# Patient Record
Sex: MALE | Race: BLACK OR AFRICAN AMERICAN | NOT HISPANIC OR LATINO | Employment: FULL TIME | URBAN - METROPOLITAN AREA
[De-identification: names, ages, dates, MRNs, and addresses within clinical notes are randomized per-mention and may not be internally consistent; named-entity substitution may affect disease eponyms.]

---

## 2017-04-07 ENCOUNTER — APPOINTMENT (OUTPATIENT)
Dept: RADIOLOGY | Facility: HOSPITAL | Age: 32
End: 2017-04-07
Attending: NURSE PRACTITIONER
Payer: COMMERCIAL

## 2017-04-07 ENCOUNTER — TELEPHONE (OUTPATIENT)
Dept: FAMILY MEDICINE | Facility: CLINIC | Age: 32
End: 2017-04-07

## 2017-04-07 ENCOUNTER — LAB VISIT (OUTPATIENT)
Dept: LAB | Facility: HOSPITAL | Age: 32
End: 2017-04-07
Payer: COMMERCIAL

## 2017-04-07 ENCOUNTER — OFFICE VISIT (OUTPATIENT)
Dept: FAMILY MEDICINE | Facility: CLINIC | Age: 32
End: 2017-04-07
Payer: COMMERCIAL

## 2017-04-07 VITALS
RESPIRATION RATE: 16 BRPM | DIASTOLIC BLOOD PRESSURE: 80 MMHG | SYSTOLIC BLOOD PRESSURE: 132 MMHG | BODY MASS INDEX: 31.06 KG/M2 | WEIGHT: 249.81 LBS | TEMPERATURE: 98 F | OXYGEN SATURATION: 97 % | HEIGHT: 75 IN | HEART RATE: 67 BPM

## 2017-04-07 DIAGNOSIS — Z00.00 ANNUAL PHYSICAL EXAM: ICD-10-CM

## 2017-04-07 DIAGNOSIS — Z00.00 ANNUAL PHYSICAL EXAM: Primary | ICD-10-CM

## 2017-04-07 DIAGNOSIS — M54.50 LEFT-SIDED LOW BACK PAIN WITHOUT SCIATICA, UNSPECIFIED CHRONICITY: ICD-10-CM

## 2017-04-07 DIAGNOSIS — R31.9 HEMATURIA: ICD-10-CM

## 2017-04-07 DIAGNOSIS — M43.10 RETROLISTHESIS OF VERTEBRAE: Primary | ICD-10-CM

## 2017-04-07 LAB
AMORPH CRY URNS QL MICRO: ABNORMAL
ANION GAP SERPL CALC-SCNC: 7 MMOL/L
BACTERIA #/AREA URNS HPF: ABNORMAL /HPF
BASOPHILS # BLD AUTO: 0.03 K/UL
BASOPHILS NFR BLD: 0.6 %
BILIRUB SERPL-MCNC: NORMAL MG/DL
BILIRUB UR QL STRIP: NEGATIVE
BLOOD URINE, POC: NORMAL
BUN SERPL-MCNC: 10 MG/DL
CALCIUM SERPL-MCNC: 9.4 MG/DL
CHLORIDE SERPL-SCNC: 106 MMOL/L
CHOLEST/HDLC SERPL: 6.1 {RATIO}
CLARITY UR: ABNORMAL
CO2 SERPL-SCNC: 28 MMOL/L
COLOR UR: ABNORMAL
COLOR, POC UA: NORMAL
CREAT SERPL-MCNC: 1 MG/DL
DIFFERENTIAL METHOD: ABNORMAL
EOSINOPHIL # BLD AUTO: 0.2 K/UL
EOSINOPHIL NFR BLD: 4.3 %
ERYTHROCYTE [DISTWIDTH] IN BLOOD BY AUTOMATED COUNT: 12.5 %
EST. GFR  (AFRICAN AMERICAN): >60 ML/MIN/1.73 M^2
EST. GFR  (NON AFRICAN AMERICAN): >60 ML/MIN/1.73 M^2
GLUCOSE SERPL-MCNC: 83 MG/DL
GLUCOSE UR QL STRIP: NEGATIVE
GLUCOSE UR QL STRIP: NORMAL
HCT VFR BLD AUTO: 45.3 %
HDL/CHOLESTEROL RATIO: 16.5 %
HDLC SERPL-MCNC: 200 MG/DL
HDLC SERPL-MCNC: 33 MG/DL
HGB BLD-MCNC: 15.7 G/DL
HGB UR QL STRIP: NEGATIVE
KETONES UR QL STRIP: NEGATIVE
KETONES UR QL STRIP: NORMAL
LDLC SERPL CALC-MCNC: 141 MG/DL
LEUKOCYTE ESTERASE UR QL STRIP: ABNORMAL
LEUKOCYTE ESTERASE URINE, POC: NORMAL
LYMPHOCYTES # BLD AUTO: 2.9 K/UL
LYMPHOCYTES NFR BLD: 54 %
MCH RBC QN AUTO: 31.5 PG
MCHC RBC AUTO-ENTMCNC: 34.7 %
MCV RBC AUTO: 91 FL
MICROSCOPIC COMMENT: ABNORMAL
MONOCYTES # BLD AUTO: 0.6 K/UL
MONOCYTES NFR BLD: 10.4 %
NEUTROPHILS # BLD AUTO: 1.7 K/UL
NEUTROPHILS NFR BLD: 30.5 %
NITRITE UR QL STRIP: NEGATIVE
NITRITE, POC UA: NORMAL
NONHDLC SERPL-MCNC: 167 MG/DL
PH UR STRIP: 5 [PH] (ref 5–8)
PH, POC UA: 6
PLATELET # BLD AUTO: 221 K/UL
PMV BLD AUTO: 11.8 FL
POTASSIUM SERPL-SCNC: 4.5 MMOL/L
PROT UR QL STRIP: NEGATIVE
PROTEIN, POC: NORMAL
RBC # BLD AUTO: 4.99 M/UL
RBC #/AREA URNS HPF: 2 /HPF (ref 0–4)
SODIUM SERPL-SCNC: 141 MMOL/L
SP GR UR STRIP: 1.03 (ref 1–1.03)
SPECIFIC GRAVITY, POC UA: 1.02
SQUAMOUS #/AREA URNS HPF: 5 /HPF
TRIGL SERPL-MCNC: 130 MG/DL
URN SPEC COLLECT METH UR: ABNORMAL
UROBILINOGEN UR STRIP-ACNC: NEGATIVE EU/DL
UROBILINOGEN, POC UA: NORMAL
WBC # BLD AUTO: 5.39 K/UL
WBC #/AREA URNS HPF: 1 /HPF (ref 0–5)

## 2017-04-07 PROCEDURE — 1160F RVW MEDS BY RX/DR IN RCRD: CPT | Mod: S$GLB,,, | Performed by: NURSE PRACTITIONER

## 2017-04-07 PROCEDURE — 36415 COLL VENOUS BLD VENIPUNCTURE: CPT

## 2017-04-07 PROCEDURE — 72114 X-RAY EXAM L-S SPINE BENDING: CPT | Mod: 26,,, | Performed by: RADIOLOGY

## 2017-04-07 PROCEDURE — 81001 URINALYSIS AUTO W/SCOPE: CPT | Mod: S$GLB,,, | Performed by: NURSE PRACTITIONER

## 2017-04-07 PROCEDURE — 85025 COMPLETE CBC W/AUTO DIFF WBC: CPT

## 2017-04-07 PROCEDURE — 99999 PR PBB SHADOW E&M-EST. PATIENT-LVL IV: CPT | Mod: PBBFAC,,, | Performed by: NURSE PRACTITIONER

## 2017-04-07 PROCEDURE — 80061 LIPID PANEL: CPT

## 2017-04-07 PROCEDURE — 87086 URINE CULTURE/COLONY COUNT: CPT

## 2017-04-07 PROCEDURE — 99214 OFFICE O/P EST MOD 30 MIN: CPT | Mod: 25,S$GLB,, | Performed by: NURSE PRACTITIONER

## 2017-04-07 PROCEDURE — 80048 BASIC METABOLIC PNL TOTAL CA: CPT

## 2017-04-07 PROCEDURE — 99395 PREV VISIT EST AGE 18-39: CPT | Mod: S$GLB,,, | Performed by: NURSE PRACTITIONER

## 2017-04-07 PROCEDURE — 72114 X-RAY EXAM L-S SPINE BENDING: CPT | Mod: TC,PN

## 2017-04-07 PROCEDURE — 81000 URINALYSIS NONAUTO W/SCOPE: CPT

## 2017-04-07 RX ORDER — DIAZEPAM 5 MG/1
5 TABLET ORAL EVERY 6 HOURS PRN
Qty: 20 TABLET | Refills: 0 | Status: SHIPPED | OUTPATIENT
Start: 2017-04-07 | End: 2019-07-05

## 2017-04-07 RX ORDER — AMLODIPINE BESYLATE 5 MG/1
5 TABLET ORAL
COMMUNITY
End: 2019-07-05 | Stop reason: SDUPTHER

## 2017-04-07 RX ORDER — IBUPROFEN 600 MG/1
600 TABLET ORAL 3 TIMES DAILY PRN
Qty: 45 TABLET | Refills: 0 | Status: SHIPPED | OUTPATIENT
Start: 2017-04-07 | End: 2017-05-07

## 2017-04-07 NOTE — PATIENT INSTRUCTIONS
Follow up in 1 year, sooner if necessary  ER for new worse or concerning symptoms    Back Basics: A Healthy Spine  A healthy spine supports the body while letting it move freely. It does this with the help of three natural curves. Strong, flexible muscles help, too. They support the spine by keeping its curves properly aligned. The disks that cushion the bones of your spine also play a role in back fitness.    Three natural curves  The spine is made of bones (vertebrae) and pads of soft tissue (disks). These parts are arranged in three curves: cervical, thoracic, and lumbar. When properly aligned, these curves keep your body balanced. They also support your body when you move. By distributing your weight throughout your spine, the curves make back injuries less likely.  Strong, flexible muscles  Strong, flexible back muscles help support the three curves of the spine. They do so by holding the vertebrae and disks in proper alignment. Strong, flexible abdominal, hip, and leg muscles also reduce strain on the back.  The lumbar curve  The lumbar curve is the hardest-working part of the spine. It carries more weight and moves the most. Aligning this curve helps prevent damage to vertebrae, disks, and other parts of the spine.  Cushioning disks  Disks are the soft pads of tissue between the vertebrae. The disks absorb shock caused by movement. Each disk has a spongy center (nucleus) and a tougher outer ring (annulus). Movement within the nucleus allows the vertebrae to rock back and forth on the disks. This provides the flexibility needed to bend and move.       Date Last Reviewed: 10/18/2015  © 5244-8208 [employer]. [address] [place] [address], [place] [address]. All rights reserved. This information is not intended as a substitute for professional medical care. Always follow your healthcare professional's instructions.        Back Care Tips    Caring for your back  These are things you can do to prevent a  recurrence of acute back pain and to reduce symptoms from chronic back pain:  · Maintain a healthy weight. If you are overweight, losing weight will help most types of back pain.  · Exercise is an important part of recovery from most types of back pain. The muscles behind and in front of the spine support the back. This means strengthening both the back muscles and the abdominal muscles will provide better support for your spine.   · Swimming and brisk walking are good overall exercises to improve your fitness level.  · Practice safe lifting methods (below).  · Practice good posture when sitting, standing and walking. Avoid prolonged sitting. This puts more stress on the lower back than standing or walking.  · Wear quality shoes with sufficient arch support. Foot and ankle alignment can affect back symptoms. Women should avoid wearing high heels.  · Therapeutic massage can help relax the back muscles without stretching them.  · During the first 24 to 72 hours after an acute injury or flare-up of chronic back pain, apply an ice pack to the painful area for 20 minutes and then remove it for 20 minutes, over a period of 60 to 90 minutes, or several times a day. As a safety precaution, do not use a heating pad at bedtime. Sleeping on a heating pad can lead to skin burns or tissue damage.  · You can alternate ice and heat therapies.  Medications  Talk to your healthcare provider before using medicines, especially if you have other medical problems or are taking other medicines.  · You may use acetaminophen or ibuprofen to control pain, unless your healthcare provider prescribed other pain medicine. If you have chronic conditions like diabetes, liver or kidney disease, stomach ulcers, or gastrointestinal bleeding, or are taking blood thinners, talk with your healthcare provider before taking any medicines.  · Be careful if you are given prescription pain medicines, narcotics, or medicine for muscle spasm. They can cause  drowsiness, affect your coordination, reflexes, and judgment. Do not drive or operate heavy machinery while taking these types of medicines. Take prescription pain medicine only as prescribed by your healthcare provider.  Lumbar stretch  Here is a simple stretching exercise that will help relax muscle spasm and keep your back more limber. If exercise makes your back pain worse, dont do it.  · Lie on your back with your knees bent and both feet on the ground.  · Slowly raise your left knee to your chest as you flatten your lower back against the floor. Hold for 5 seconds.  · Relax and repeat the exercise with your right knee.  · Do 10 of these exercises for each leg.  Safe lifting method  · Dont bend over at the waist to lift an object off the floor.  Instead, bend your knees and hips in a squat.   · Keep your back and head upright  · Hold the object close to your body, directly in front of you.  · Straighten your legs to lift the object.   · Lower the object to the floor in the reverse fashion.  · If you must slide something across the floor, push it.  Posture tips  Sitting  Sit in chairs with straight backs or low-back support. Keep your knees lower than your hips, with your feet flat on the floor.  When driving, sit up straight. Adjust the seat forward so you are not leaning toward the steering wheel.  A small pillow or rolled towel behind your lower back may help if you are driving long distances.   Standing  When standing for long periods, shift most of your weight to one leg at a time. Alternate legs every few minutes.   Sleeping  The best way to sleep is on your side with your knees bent. Put a low pillow under your head to support your neck in a neutral spine position. Avoid thick pillows that bend your neck to one side. Put a pillow between your legs to further relax your lower back. If you sleep on your back, put pillows under your knees to support your legs in a slightly flexed position. Use a firm  mattress. If your mattress sags, replace it, or use a 1/2-inch plywood board under the mattress to add support.  Follow-up care  Follow up with your healthcare provider, or as advised.  If X-rays, a CT scan or an MRI scan were taken, they will be reviewed by a radiologist. You will be notified of any new findings that may affect your care.  Call 911  Seek emergency medical care if any of the following occur:  · Trouble breathing  · Confusion  · Very drowsy  · Fainting or loss of consciousness  · Rapid or very slow heart rate  · Loss of  bowel or bladder control  When to seek medical care  Call your healthcare provider if any of the following occur:  · Pain becomes worse or spreads to your arms or legs  · Weakness or numbness in one or both arms or legs  · Numbness in the groin area  Date Last Reviewed: 6/1/2016  © 9808-2198 [employer]. [address] [place] [address], [place] [address]. All rights reserved. This information is not intended as a substitute for professional medical care. Always follow your healthcare professional's instructions.

## 2017-04-07 NOTE — PROGRESS NOTES
Subjective:       Patient ID: Alvaro Ayoub is a 31 y.o. male.    Chief Complaint: Annual Exam    HPI Mr Ayoub is here for left sided lower back pain, he states it's been intermittently ongoing for many years.  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  He's used NSAIDs and Flexeril without any relief.  Denies any saddle paresthesia or bowel or bladder incontinence.  The pain does not radiate.  Review of Systems   Constitutional: Negative for fever.   Respiratory: Negative.    Cardiovascular: Negative.    Genitourinary: Negative for dysuria and frequency.   Musculoskeletal: Positive for back pain.       Objective:      Physical Exam   Constitutional: He is oriented to person, place, and time. He appears well-developed and well-nourished. No distress.   Cardiovascular: Normal rate.    Pulmonary/Chest: Effort normal.   Musculoskeletal: Normal range of motion. He exhibits no edema.   Negative SLR   Neurological: He is alert and oriented to person, place, and time.   Reflex Scores:       Patellar reflexes are 2+ on the right side and 2+ on the left side.  No clonus, no hyperreflexia   Skin: Skin is warm and dry. No erythema.   Psychiatric: He has a normal mood and affect. His behavior is normal.   Vitals reviewed.      Assessment:       1. Annual physical exam    2. Left-sided low back pain without sciatica, unspecified chronicity    3. Hematuria        Plan:       Annual physical exam  -     Urinalysis  -     CBC auto differential; Future; Expected date: 4/7/17  -     Basic metabolic panel; Future; Expected date: 4/7/17  -     Lipid panel; Future; Expected date: 4/7/17    Left-sided low back pain without sciatica, unspecified chronicity  -     POCT urinalysis, dipstick or tablet reag  -     ibuprofen (ADVIL,MOTRIN) 600 MG tablet; Take 1 tablet (600 mg total) by mouth 3 (three) times daily as needed for Pain.  Dispense: 45 tablet; Refill: 0  -     diazePAM (VALIUM) 5 MG tablet;  Take 1 tablet (5 mg total) by mouth every 6 (six) hours as needed for Anxiety.  Dispense: 20 tablet; Refill: 0  -     X-Ray Lumbar Complete With Flex And Ext; Future; Expected date: 4/7/17    Hematuria  -     Urine culture    Most likely msk, will try NSAID and prn valium, imaging.  Consider referral/PT if not improved    Verbalized understanding

## 2017-04-07 NOTE — PROGRESS NOTES
Subjective:       Patient ID: Alvaro Ayoub is a 31 y.o. male.    Chief Complaint: Annual Exam    HPI Mr Ayoub is here for his annual exam.  He feels well today. He reports that he eats healthy and exercises as well as med compliance.  He is due for baseline labs, declines flu shot.  Review of Systems   Constitutional: Negative for fever.   Respiratory: Negative.    Cardiovascular: Negative.        Objective:      Physical Exam   Constitutional: He is oriented to person, place, and time. He appears well-developed and well-nourished. He does not appear ill. No distress.   Cardiovascular: Normal rate, regular rhythm and normal heart sounds.  Exam reveals no friction rub.    No murmur heard.  Pulses:       Dorsalis pedis pulses are 2+ on the right side, and 2+ on the left side.        Posterior tibial pulses are 2+ on the right side, and 2+ on the left side.   Pulmonary/Chest: Effort normal and breath sounds normal. No respiratory distress. He has no decreased breath sounds. He has no wheezes. He has no rhonchi. He has no rales.   Abdominal: There is no hepatosplenomegaly. There is no tenderness.   Musculoskeletal: Normal range of motion. He exhibits no edema.   Neurological: He is alert and oriented to person, place, and time.   Skin: Skin is warm and dry. No erythema.   Psychiatric: He has a normal mood and affect. His behavior is normal.   Vitals reviewed.      Assessment:       1. Annual physical exam    2. Left-sided low back pain without sciatica, unspecified chronicity    3. Hematuria        Plan:       Annual physical exam  -     Urinalysis  -     CBC auto differential; Future; Expected date: 4/7/17  -     Basic metabolic panel; Future; Expected date: 4/7/17  -     Lipid panel; Future; Expected date: 4/7/17    Left-sided low back pain without sciatica, unspecified chronicity  -     POCT urinalysis, dipstick or tablet reag  -     ibuprofen (ADVIL,MOTRIN) 600 MG tablet; Take 1 tablet (600 mg total) by mouth  3 (three) times daily as needed for Pain.  Dispense: 45 tablet; Refill: 0  -     diazePAM (VALIUM) 5 MG tablet; Take 1 tablet (5 mg total) by mouth every 6 (six) hours as needed for Anxiety.  Dispense: 20 tablet; Refill: 0  -     X-Ray Lumbar Complete With Flex And Ext; Future; Expected date: 4/7/17    Hematuria  -     Urine culture    Basic labs today, f/u in 1 year, sooner if necessary    Verbalized understanding

## 2017-04-07 NOTE — MR AVS SNAPSHOT
[facility]  [address] [place] [address]  Phone: [phone]                  Alvaro Ayobu   2017 7:20 AM   Office Visit    Description:  Male : 1985   Provider:  Roberta Darnell, NP-C   Department:  [facility]           Reason for Visit     Annual Exam           Diagnoses this Visit        Comments    Annual physical exam    -  Primary     Left-sided low back pain without sciatica, unspecified chronicity                To Do List           Goals (5 Years of Data)     None       These Medications        Disp Refills Start End    ibuprofen (ADVIL,MOTRIN) 600 MG tablet 45 tablet 0 2017    Take 1 tablet (600 mg total) by mouth 3 (three) times daily as needed for Pain. - Oral    [facility] [address] [place] [address] [facility] AT [facility] Ph #: [phone]       diazePAM (VALIUM) 5 MG tablet 20 tablet 0 2017    Take 1 tablet (5 mg total) by mouth every 6 (six) hours as needed for Anxiety. - Oral    Pharmacy: [facility] [address] [place] [address] [address] [employer] [place] AT [facility] Ph #: [phone]         [facility]     [facility] -  Assistance  Unless otherwise directed by your provider, please contact [facility], our nurse care line that is available for  assistance.     Registered nurses in the [facility] provide: appointment scheduling, clinical advisement, health education, and other advisory services.  Call: [phone] (toll free)               Medications           Message regarding Medications     Verify the changes and/or additions to your medication regime listed below are the same as discussed with your clinician today.  If any of these changes or additions are incorrect, please notify your healthcare provider.        START taking these NEW medications        Refills    ibuprofen (ADVIL,MOTRIN)  "600 MG tablet 0    Sig: Take 1 tablet (600 mg total) by mouth 3 (three) times daily as needed for Pain.    Class: Normal    Route: Oral    diazePAM (VALIUM) 5 MG tablet 0    Sig: Take 1 tablet (5 mg total) by mouth every 6 (six) hours as needed for Anxiety.    Class: Normal    Route: Oral           Verify that the below list of medications is an accurate representation of the medications you are currently taking.  If none reported, the list may be blank. If incorrect, please contact your healthcare provider. Carry this list with you in case of emergency.           Current Medications     amlodipine (NORVASC) 5 MG tablet Take 5 mg by mouth.    diazePAM (VALIUM) 5 MG tablet Take 1 tablet (5 mg total) by mouth every 6 (six) hours as needed for Anxiety.    ibuprofen (ADVIL,MOTRIN) 600 MG tablet Take 1 tablet (600 mg total) by mouth 3 (three) times daily as needed for Pain.           Clinical Reference Information           Your Vitals Were     BP Pulse Temp Resp Height Weight    132/80 (BP Location: Right arm, Patient Position: Sitting, BP Method: Manual) 67 98.4 °F (36.9 °C) (Oral) 16 6' 3" (1.905 m) 113.3 kg (249 lb 12.5 oz)    SpO2 BMI             97% 31.22 kg/m2         Blood Pressure          Most Recent Value    BP  132/80      Allergies as of 4/7/2017     No Known Allergies      Immunizations Administered on Date of Encounter - 4/7/2017     None      Orders Placed During Today's Visit      Normal Orders This Visit    POCT urinalysis, dipstick or tablet reag     Urinalysis     Future Labs/Procedures Expected by Expires    Basic metabolic panel  4/7/2017 4/7/2018    CBC auto differential  4/7/2017 4/7/2018    Lipid panel  4/7/2017 6/6/2018    X-Ray Lumbar Complete With Flex And Ext  4/7/2017 4/7/2018      Instructions    Follow up in 1 year, sooner if necessary  ER for new worse or concerning symptoms    Back Basics: A Healthy Spine  A healthy spine supports the body while letting it move freely. It does this with " the help of three natural curves. Strong, flexible muscles help, too. They support the spine by keeping its curves properly aligned. The disks that cushion the bones of your spine also play a role in back fitness.    Three natural curves  The spine is made of bones (vertebrae) and pads of soft tissue (disks). These parts are arranged in three curves: cervical, thoracic, and lumbar. When properly aligned, these curves keep your body balanced. They also support your body when you move. By distributing your weight throughout your spine, the curves make back injuries less likely.  Strong, flexible muscles  Strong, flexible back muscles help support the three curves of the spine. They do so by holding the vertebrae and disks in proper alignment. Strong, flexible abdominal, hip, and leg muscles also reduce strain on the back.  The lumbar curve  The lumbar curve is the hardest-working part of the spine. It carries more weight and moves the most. Aligning this curve helps prevent damage to vertebrae, disks, and other parts of the spine.  Cushioning disks  Disks are the soft pads of tissue between the vertebrae. The disks absorb shock caused by movement. Each disk has a spongy center (nucleus) and a tougher outer ring (annulus). Movement within the nucleus allows the vertebrae to rock back and forth on the disks. This provides the flexibility needed to bend and move.       Date Last Reviewed: 10/18/2015  © 0359-9989 [employer]. [address] [place] [address], [place] [address]. All rights reserved. This information is not intended as a substitute for professional medical care. Always follow your healthcare professional's instructions.        Back Care Tips    Caring for your back  These are things you can do to prevent a recurrence of acute back pain and to reduce symptoms from chronic back pain:  · Maintain a healthy weight. If you are overweight, losing weight will help most types of back pain.  · Exercise is an  important part of recovery from most types of back pain. The muscles behind and in front of the spine support the back. This means strengthening both the back muscles and the abdominal muscles will provide better support for your spine.   · Swimming and brisk walking are good overall exercises to improve your fitness level.  · Practice safe lifting methods (below).  · Practice good posture when sitting, standing and walking. Avoid prolonged sitting. This puts more stress on the lower back than standing or walking.  · Wear quality shoes with sufficient arch support. Foot and ankle alignment can affect back symptoms. Women should avoid wearing high heels.  · Therapeutic massage can help relax the back muscles without stretching them.  · During the first 24 to 72 hours after an acute injury or flare-up of chronic back pain, apply an ice pack to the painful area for 20 minutes and then remove it for 20 minutes, over a period of 60 to 90 minutes, or several times a day. As a safety precaution, do not use a heating pad at bedtime. Sleeping on a heating pad can lead to skin burns or tissue damage.  · You can alternate ice and heat therapies.  Medications  Talk to your healthcare provider before using medicines, especially if you have other medical problems or are taking other medicines.  · You may use acetaminophen or ibuprofen to control pain, unless your healthcare provider prescribed other pain medicine. If you have chronic conditions like diabetes, liver or kidney disease, stomach ulcers, or gastrointestinal bleeding, or are taking blood thinners, talk with your healthcare provider before taking any medicines.  · Be careful if you are given prescription pain medicines, narcotics, or medicine for muscle spasm. They can cause drowsiness, affect your coordination, reflexes, and judgment. Do not drive or operate heavy machinery while taking these types of medicines. Take prescription pain medicine only as prescribed by  your healthcare provider.  Lumbar stretch  Here is a simple stretching exercise that will help relax muscle spasm and keep your back more limber. If exercise makes your back pain worse, dont do it.  · Lie on your back with your knees bent and both feet on the ground.  · Slowly raise your left knee to your chest as you flatten your lower back against the floor. Hold for 5 seconds.  · Relax and repeat the exercise with your right knee.  · Do 10 of these exercises for each leg.  Safe lifting method  · Dont bend over at the waist to lift an object off the floor.  Instead, bend your knees and hips in a squat.   · Keep your back and head upright  · Hold the object close to your body, directly in front of you.  · Straighten your legs to lift the object.   · Lower the object to the floor in the reverse fashion.  · If you must slide something across the floor, push it.  Posture tips  Sitting  Sit in chairs with straight backs or low-back support. Keep your knees lower than your hips, with your feet flat on the floor.  When driving, sit up straight. Adjust the seat forward so you are not leaning toward the steering wheel.  A small pillow or rolled towel behind your lower back may help if you are driving long distances.   Standing  When standing for long periods, shift most of your weight to one leg at a time. Alternate legs every few minutes.   Sleeping  The best way to sleep is on your side with your knees bent. Put a low pillow under your head to support your neck in a neutral spine position. Avoid thick pillows that bend your neck to one side. Put a pillow between your legs to further relax your lower back. If you sleep on your back, put pillows under your knees to support your legs in a slightly flexed position. Use a firm mattress. If your mattress sags, replace it, or use a 1/2-inch plywood board under the mattress to add support.  Follow-up care  Follow up with your healthcare provider, or as advised.  If X-rays, a  CT scan or an MRI scan were taken, they will be reviewed by a radiologist. You will be notified of any new findings that may affect your care.  Call 911  Seek emergency medical care if any of the following occur:  · Trouble breathing  · Confusion  · Very drowsy  · Fainting or loss of consciousness  · Rapid or very slow heart rate  · Loss of  bowel or bladder control  When to seek medical care  Call your healthcare provider if any of the following occur:  · Pain becomes worse or spreads to your arms or legs  · Weakness or numbness in one or both arms or legs  · Numbness in the groin area  Date Last Reviewed: 6/1/2016  © 2850-3805 [employer]. [address] [place] [address], [place] [address]. All rights reserved. This information is not intended as a substitute for professional medical care. Always follow your healthcare professional's instructions.             Language Assistance Services     ATTENTION: Language assistance services are available, free of charge. Please call [phone].      ATENCIÓN: Si habla español, tiene a solano disposición servicios gratuitos de asistencia lingüística. Llame al [phone].     [facility] Ý: N?u b?n nói Ti?ng Vi?t, có các d?ch v? h? tr? ngôn ng? mi?n phí dành cho b?n. G?i s? [phone].         [facility] complies with applicable Federal civil rights laws and does not discriminate on the basis of race, color, national origin, age, disability, or sex.

## 2017-04-07 NOTE — TELEPHONE ENCOUNTER
his xray shows a mild malalignment of your discs where your lumbar spine meets your sacrum, this can be fixed with PT.  Let me know if you have any questions.

## 2017-04-09 LAB — BACTERIA UR CULT: NO GROWTH

## 2017-05-18 ENCOUNTER — CLINICAL SUPPORT (OUTPATIENT)
Dept: REHABILITATION | Facility: HOSPITAL | Age: 32
End: 2017-05-18
Attending: NURSE PRACTITIONER
Payer: COMMERCIAL

## 2017-05-18 DIAGNOSIS — M62.89 MUSCLE TIGHTNESS: ICD-10-CM

## 2017-05-18 DIAGNOSIS — G89.29 CHRONIC MIDLINE LOW BACK PAIN WITHOUT SCIATICA: ICD-10-CM

## 2017-05-18 DIAGNOSIS — M62.81 WEAKNESS OF TRUNK MUSCULATURE: ICD-10-CM

## 2017-05-18 DIAGNOSIS — M54.50 CHRONIC MIDLINE LOW BACK PAIN WITHOUT SCIATICA: ICD-10-CM

## 2017-05-18 PROCEDURE — 97110 THERAPEUTIC EXERCISES: CPT | Mod: PN

## 2017-05-18 PROCEDURE — 97161 PT EVAL LOW COMPLEX 20 MIN: CPT | Mod: PN

## 2017-05-18 NOTE — PLAN OF CARE
"  TIME RECORD    Date: 05/18/2017    Start Time:  1200  Stop Time:  1300      OUTPATIENT PHYSICAL THERAPY   PATIENT EVALUATION  Onset Date: many years prior  Primary Diagnosis:   1. Chronic midline low back pain without sciatica     2. Muscle tightness     3. Weakness of trunk musculature       Treatment Diagnosis: decreased ROM lumbar spine, joint stiffness of lumbar spine, weakness of trunk, muscle tightness  Past Medical History:   Diagnosis Date    Hypertension      Precautions: none  Prior Therapy: none  Medications: Alvaro Ayoub has a current medication list which includes the following prescription(s): amlodipine and diazepam.  Nutrition:  Normal  History of Present Illness: Pt with significant history of lower back pain for many years, pt reporting if he rested the pain would go away, but it doesn't go away anymore. Pt denies treatment for lower back.   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 PT denies paresthesia in BLE, drop foot, changes in B/B control, unexplained weight gain/loss, fever, chills, or night sweats. Pt reports he has pain at all times (since about 6 months ago), however fluctuates in intensity depending on his activity. Pt reports he does not have pain while he is playing [employer] or while he is bent over doing work activities, but the next day or when he stands striaght up with significantly increase pain.     Pain:  Location: midline lumbar spine "   Description: Aching and pinching  Activities Which Increase Pain: Sitting, Standing, Bending, Walking and general increased activiting  Activities Which Decrease Pain: bowel movement, heating pad and rest  Pain Scale: 2/10 at best 6/10 now  10/10 at worst    Objective     Objective   Observation: pt in standing with decreased lumbar lordosis, level innominate and LE bony landmarks  AROM:    FB  Fingertips able to touch floor, no reversal of lumbar spine increased pain with return to neutral                    BB       Significant increased pain 50% limitation                 SBR   Fingertips to tibiofemoral joint line                     SBL    Fingertips to tibiofemoral joint line increased pain L side lower back                 RotL       Increased pain lower L side of back                   Rot R  25% limitation no pain  STR/myotome:  R L   hip flex     4+ 4+                     knee ext     5 5                        ankle DF      5 5                     great toe ext     5 5                   ankle PF           5 5                    knee flex  5 5  Sensation/Reflexes: grossly intact to light touch throughout BLE  Palpation: significant tenderness to palpation over the L QL at the iliac crest attachement  Gait: WNL without AD  Tests:                      SLR: neg   Crossed SLR: neg   Slump Test: neg   Prone Instability/lumbar joint play: L1-4 3/6; L4/5 3-/6; L5/S1 2/6 with increased pain   ALYSSA: neg  Flexibility:   Hamstring: 10% limitation bilaterally            hip flexors: positive meagan bilaterally                 rectus femoris: mild position ely bilaterally                                       Hip screen: R L   Flex 4+ 4+   Abd 4+ 4+   Add 4 4   Ext 4 4   IR 4 4   ER 4 4                                                                                                         Functional assessment: able to heel and toe walk without the provocation of pain    FOTO: 35% limitation    today's treatment x15  "minutes:  education: educated in evaluation findings and POC.  Educated in expectations of treatment, provider's contact information (email and phone) given to pt for communication of any questions and concerns.  HEP instruction and ther ex: instructed and performed following:   LTR x20   SKTC x10 5 second holds   SL clam shell 2x10   Seated piriformis stretch 2x30 seconds  manual therapy:   Application of kinesiotape: 2" I strip to L QL - inhibition technique    Patient received education regarding appropriate care and removal of Kinesiotape. Patient instructed in proper removal techniques if skin irritation occurs.      Assessment       Initial Assessment (Pertinent finding, problem list and factors affecting outcome): Mr. Alvaro Ayoub is a 30 y/o male referred to outpatient PT for lower back pain. Pt presents with decreased ROM of the lumbar spine, weakness of bilateral hips and trunk, muscle tightness in BLE, joint stiffness of the lumbar spine, and limitations in tissue mobility with tenderness of the L QL. Pt signs and symptoms consistent with referring diagnosis, likely secondary to postural changes seen on X-ray and compensatory muscle strain of the lumbar spine paraspinals. Pt would benefit from skilled PT to address above stated problems, as well as, achieve pt goals within a timely manner. Pt has set realistic goals and has verbalized good understanding and agreement with reported diagnosis, prognosis and treatment. Pt demonstrates no additional cultural, spiritual or educational need and currently has no barriers to learning.      History  Co-morbidities and personal factors that may impact the plan of care Examination  Body Structures and Functions, activity limitations and participation restrictions that may impact the plan of care Clinical Presentation   Decision Making/ Complexity Score   Co-morbidities:      none            Personal Factors:   Very active lifestyle Body Regions: BLE, lumbar " spine  weakness, pain, decreased ROM, impaired joint extensibility and impaired muscle length  Body Systems:   MSK      Activity limitations:   Unable to squat, lift, walk for .5 miles, sit for >20 minutes    Participation Restrictions: (W/D/S)  Unable to participate fully on basketball team, unable to sit in car for family road trips, unable to participate in household chores       stable  low             Rehab Potiential: good    Short Term Goals (4 Weeks):   1. Pt will report 20% reduced pain within the lumbar spine for ease with walking   2. Pt will demonstrate 1/3 improvement MMT in BLE for ease with taking care of child  4. Pt will demonstrate static standing balance on BLE for 30 seconds without obvious instability or use of UE assistance  5. Pt will demonstrate improved lumbar ROM by 25% in all directions for ease with picking an object up from the floor  Long Term Goals (8 Weeks):   1.Pt will report <4/10 pain within the lumbar spine for ease with ADL's  2. Pt will demonstrate 50% improvement of hamstring and hip flexor length in BLE for ease with ambulation  3. Pt will be independent with HEP for maintenance of improvements gained in therapy sessions   4. Pt will demonstrate 4+/5 strength or greater in BLE for ease with running errands           Plan     Certification Period: 5/18/17 to 8/18/17  Recommended Treatment Plan: 1-2 times per week for 6-8 weeks: Manual Therapy, Moist Heat/ Ice, Neuromuscular Re-ed, Patient Education and Therapeutic Exercise  Other Recommendations: none      Therapist: Nicole Reed, PT    I CERTIFY THE NEED FOR THESE SERVICES FURNISHED UNDER THIS PLAN OF TREATMENT AND WHILE UNDER MY CARE    Physician's comments: ________________________________________________________________________________________________________________________________________________      Physician's Name: ___________________________________

## 2017-05-18 NOTE — PROGRESS NOTES
"  TIME RECORD    Date: 05/18/2017    Start Time:  1200  Stop Time:  1300      OUTPATIENT PHYSICAL THERAPY   PATIENT EVALUATION  Onset Date: many years prior  Primary Diagnosis:   1. Chronic midline low back pain without sciatica     2. Muscle tightness     3. Weakness of trunk musculature       Treatment Diagnosis: decreased ROM lumbar spine, joint stiffness of lumbar spine, weakness of trunk, muscle tightness  Past Medical History:   Diagnosis Date    Hypertension      Precautions: none  Prior Therapy: none  Medications: Alvaro Ayoub has a current medication list which includes the following prescription(s): amlodipine and diazepam.  Nutrition:  Normal  History of Present Illness: Pt with significant history of lower back pain for many years, pt reporting if he rested the pain would go away, but it doesn't go away anymore. Pt denies treatment for lower back.   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 PT denies paresthesia in BLE, drop foot, changes in B/B control, unexplained weight gain/loss, fever, chills, or night sweats. Pt reports he has pain at all times (since about 6 months ago), however fluctuates in intensity depending on his activity. Pt reports he does not have pain while he is playing [employer] or while he is bent over doing work activities, but the next day or when he stands striaght up with significantly increase pain.     Pain:  Location: midline lumbar spine "   Description: Aching and pinching  Activities Which Increase Pain: Sitting, Standing, Bending, Walking and general increased activiting  Activities Which Decrease Pain: bowel movement, heating pad and rest  Pain Scale: 2/10 at best 6/10 now  10/10 at worst    Objective     Objective   Observation: pt in standing with decreased lumbar lordosis, level innominate and LE bony landmarks  AROM:    FB  Fingertips able to touch floor, no reversal of lumbar spine increased pain with return to neutral                    BB       Significant increased pain 50% limitation                 SBR   Fingertips to tibiofemoral joint line                     SBL    Fingertips to tibiofemoral joint line increased pain L side lower back                 RotL       Increased pain lower L side of back                   Rot R  25% limitation no pain  STR/myotome:  R L   hip flex     4+ 4+                     knee ext     5 5                        ankle DF      5 5                     great toe ext     5 5                   ankle PF           5 5                    knee flex  5 5  Sensation/Reflexes: grossly intact to light touch throughout BLE  Palpation: significant tenderness to palpation over the L QL at the iliac crest attachement  Gait: WNL without AD  Tests:                      SLR: neg   Crossed SLR: neg   Slump Test: neg   Prone Instability/lumbar joint play: L1-4 3/6; L4/5 3-/6; L5/S1 2/6 with increased pain   ALYSSA: neg  Flexibility:   Hamstring: 10% limitation bilaterally            hip flexors: positive meagan bilaterally                 rectus femoris: mild position ely bilaterally                                       Hip screen: R L   Flex 4+ 4+   Abd 4+ 4+   Add 4 4   Ext 4 4   IR 4 4   ER 4 4                                                                                                         Functional assessment: able to heel and toe walk without the provocation of pain    FOTO: 35% limitation    today's treatment x15  "minutes:  education: educated in evaluation findings and POC.  Educated in expectations of treatment, provider's contact information (email and phone) given to pt for communication of any questions and concerns.  HEP instruction and ther ex: instructed and performed following:   LTR x20   SKTC x10 5 second holds   SL clam shell 2x10   Seated piriformis stretch 2x30 seconds  manual therapy:   Application of kinesiotape: 2" I strip to L QL - inhibition technique    Patient received education regarding appropriate care and removal of Kinesiotape. Patient instructed in proper removal techniques if skin irritation occurs.      Assessment       Initial Assessment (Pertinent finding, problem list and factors affecting outcome): Mr. Alvaro Ayoub is a 30 y/o male referred to outpatient PT for lower back pain. Pt presents with decreased ROM of the lumbar spine, weakness of bilateral hips and trunk, muscle tightness in BLE, joint stiffness of the lumbar spine, and limitations in tissue mobility with tenderness of the L QL. Pt signs and symptoms consistent with referring diagnosis, likely secondary to postural changes seen on X-ray and compensatory muscle strain of the lumbar spine paraspinals. Pt would benefit from skilled PT to address above stated problems, as well as, achieve pt goals within a timely manner. Pt has set realistic goals and has verbalized good understanding and agreement with reported diagnosis, prognosis and treatment. Pt demonstrates no additional cultural, spiritual or educational need and currently has no barriers to learning.      History  Co-morbidities and personal factors that may impact the plan of care Examination  Body Structures and Functions, activity limitations and participation restrictions that may impact the plan of care Clinical Presentation   Decision Making/ Complexity Score   Co-morbidities:      none            Personal Factors:   Very active lifestyle Body Regions: BLE, lumbar " spine  weakness, pain, decreased ROM, impaired joint extensibility and impaired muscle length  Body Systems:   MSK      Activity limitations:   Unable to squat, lift, walk for .5 miles, sit for >20 minutes    Participation Restrictions: (W/D/S)  Unable to participate fully on basketball team, unable to sit in car for family road trips, unable to participate in household chores       stable  low             Rehab Potiential: good    Short Term Goals (4 Weeks):   1. Pt will report 20% reduced pain within the lumbar spine for ease with walking   2. Pt will demonstrate 1/3 improvement MMT in BLE for ease with taking care of child  4. Pt will demonstrate static standing balance on BLE for 30 seconds without obvious instability or use of UE assistance  5. Pt will demonstrate improved lumbar ROM by 25% in all directions for ease with picking an object up from the floor  Long Term Goals (8 Weeks):   1.Pt will report <4/10 pain within the lumbar spine for ease with ADL's  2. Pt will demonstrate 50% improvement of hamstring and hip flexor length in BLE for ease with ambulation  3. Pt will be independent with HEP for maintenance of improvements gained in therapy sessions   4. Pt will demonstrate 4+/5 strength or greater in BLE for ease with running errands           Plan     Certification Period: 5/18/17 to 8/18/17  Recommended Treatment Plan: 1-2 times per week for 6-8 weeks: Manual Therapy, Moist Heat/ Ice, Neuromuscular Re-ed, Patient Education and Therapeutic Exercise  Other Recommendations: none      Therapist: Nicole Reed, PT    I CERTIFY THE NEED FOR THESE SERVICES FURNISHED UNDER THIS PLAN OF TREATMENT AND WHILE UNDER MY CARE    Physician's comments: ________________________________________________________________________________________________________________________________________________      Physician's Name: ___________________________________

## 2017-06-02 ENCOUNTER — TELEPHONE (OUTPATIENT)
Dept: REHABILITATION | Facility: HOSPITAL | Age: 32
End: 2017-06-02

## 2018-01-11 ENCOUNTER — OFFICE VISIT (OUTPATIENT)
Dept: FAMILY MEDICINE | Facility: CLINIC | Age: 33
End: 2018-01-11
Payer: COMMERCIAL

## 2018-01-11 VITALS
BODY MASS INDEX: 31.55 KG/M2 | TEMPERATURE: 98 F | SYSTOLIC BLOOD PRESSURE: 156 MMHG | WEIGHT: 253.75 LBS | DIASTOLIC BLOOD PRESSURE: 94 MMHG | HEART RATE: 66 BPM | HEIGHT: 75 IN | RESPIRATION RATE: 10 BRPM | OXYGEN SATURATION: 96 %

## 2018-01-11 DIAGNOSIS — M54.50 ACUTE LEFT-SIDED LOW BACK PAIN WITHOUT SCIATICA: ICD-10-CM
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
## 2018-01-11 PROCEDURE — 99214 OFFICE O/P EST MOD 30 MIN: CPT | Mod: S$GLB,,, | Performed by: FAMILY MEDICINE

## 2018-01-11 PROCEDURE — 99999 PR PBB SHADOW E&M-EST. PATIENT-LVL IV: CPT | Mod: PBBFAC,,, | Performed by: FAMILY MEDICINE

## 2018-01-11 RX ORDER — NAPROXEN 500 MG/1
500 TABLET ORAL 2 TIMES DAILY
COMMUNITY
End: 2019-06-24 | Stop reason: ALTCHOICE

## 2018-01-11 RX ORDER — DICLOFENAC SODIUM 75 MG/1
75 TABLET, DELAYED RELEASE ORAL 2 TIMES DAILY PRN
Qty: 60 TABLET | Refills: 1 | Status: SHIPPED | OUTPATIENT
Start: 2018-01-11 | End: 2019-07-05

## 2018-01-11 RX ORDER — PREDNISONE 20 MG/1
60 TABLET ORAL DAILY
Qty: 15 TABLET | Refills: 0 | Status: SHIPPED | OUTPATIENT
Start: 2018-01-11 | End: 2018-01-16

## 2018-01-11 RX ORDER — TIZANIDINE 4 MG/1
4 TABLET ORAL EVERY 6 HOURS PRN
Qty: 30 TABLET | Refills: 0 | Status: SHIPPED | OUTPATIENT
Start: 2018-01-11 | End: 2018-01-21

## 2018-01-11 RX ORDER — TRAMADOL HYDROCHLORIDE 50 MG/1
50 TABLET ORAL EVERY 6 HOURS PRN
COMMUNITY
End: 2019-07-05
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
## 2018-02-07 ENCOUNTER — CLINICAL SUPPORT (OUTPATIENT)
Dept: REHABILITATION | Facility: OTHER | Age: 33
End: 2018-02-07
Attending: PHYSICAL MEDICINE & REHABILITATION
Payer: COMMERCIAL

## 2018-02-07 DIAGNOSIS — M54.50 ACUTE LEFT-SIDED LOW BACK PAIN WITHOUT SCIATICA: ICD-10-CM

## 2018-02-07 PROCEDURE — 97162 PT EVAL MOD COMPLEX 30 MIN: CPT | Performed by: PHYSICAL THERAPIST

## 2018-02-07 PROCEDURE — G8978 MOBILITY CURRENT STATUS: HCPCS | Mod: CJ | Performed by: PHYSICAL THERAPIST

## 2018-02-07 PROCEDURE — 97110 THERAPEUTIC EXERCISES: CPT | Performed by: PHYSICAL THERAPIST

## 2018-02-07 PROCEDURE — G8979 MOBILITY GOAL STATUS: HCPCS | Mod: CI | Performed by: PHYSICAL THERAPIST
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
## 2018-02-14 ENCOUNTER — CLINICAL SUPPORT (OUTPATIENT)
Dept: REHABILITATION | Facility: HOSPITAL | Age: 33
End: 2018-02-14
Payer: COMMERCIAL

## 2018-02-14 DIAGNOSIS — M54.50 ACUTE LEFT-SIDED LOW BACK PAIN WITHOUT SCIATICA: Primary | ICD-10-CM

## 2018-02-14 PROCEDURE — 97110 THERAPEUTIC EXERCISES: CPT | Mod: PN
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
## 2018-02-20 ENCOUNTER — CLINICAL SUPPORT (OUTPATIENT)
Dept: REHABILITATION | Facility: HOSPITAL | Age: 33
End: 2018-02-20
Payer: COMMERCIAL

## 2018-02-20 DIAGNOSIS — M54.50 ACUTE LEFT-SIDED LOW BACK PAIN WITHOUT SCIATICA: Primary | ICD-10-CM

## 2018-02-20 PROCEDURE — 97110 THERAPEUTIC EXERCISES: CPT | Mod: PN
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
## 2018-02-27 ENCOUNTER — CLINICAL SUPPORT (OUTPATIENT)
Dept: REHABILITATION | Facility: HOSPITAL | Age: 33
End: 2018-02-27
Payer: COMMERCIAL

## 2018-02-27 DIAGNOSIS — M54.50 ACUTE LEFT-SIDED LOW BACK PAIN WITHOUT SCIATICA: Primary | ICD-10-CM

## 2018-02-27 PROCEDURE — 97110 THERAPEUTIC EXERCISES: CPT | Mod: PN

## 2018-02-27 PROCEDURE — 97140 MANUAL THERAPY 1/> REGIONS: CPT | Mod: PN
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
## 2018-03-01 ENCOUNTER — CLINICAL SUPPORT (OUTPATIENT)
Dept: REHABILITATION | Facility: HOSPITAL | Age: 33
End: 2018-03-01
Payer: COMMERCIAL

## 2018-03-01 DIAGNOSIS — M54.50 ACUTE LEFT-SIDED LOW BACK PAIN WITHOUT SCIATICA: Primary | ICD-10-CM

## 2018-03-01 PROCEDURE — 97110 THERAPEUTIC EXERCISES: CPT | Mod: PN
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
## 2018-03-07 ENCOUNTER — CLINICAL SUPPORT (OUTPATIENT)
Dept: REHABILITATION | Facility: HOSPITAL | Age: 33
End: 2018-03-07
Payer: COMMERCIAL

## 2018-03-07 DIAGNOSIS — M62.89 MUSCLE TIGHTNESS: ICD-10-CM

## 2018-03-07 DIAGNOSIS — M54.50 ACUTE LEFT-SIDED LOW BACK PAIN WITHOUT SCIATICA: Primary | ICD-10-CM

## 2018-03-07 DIAGNOSIS — M54.50 CHRONIC MIDLINE LOW BACK PAIN WITHOUT SCIATICA: ICD-10-CM

## 2018-03-07 DIAGNOSIS — M62.81 WEAKNESS OF TRUNK MUSCULATURE: ICD-10-CM

## 2018-03-07 DIAGNOSIS — G89.29 CHRONIC MIDLINE LOW BACK PAIN WITHOUT SCIATICA: ICD-10-CM

## 2018-03-07 PROCEDURE — 97110 THERAPEUTIC EXERCISES: CPT | Mod: PN
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
## 2018-03-09 ENCOUNTER — CLINICAL SUPPORT (OUTPATIENT)
Dept: REHABILITATION | Facility: HOSPITAL | Age: 33
End: 2018-03-09
Payer: COMMERCIAL

## 2018-03-09 DIAGNOSIS — M54.50 ACUTE LEFT-SIDED LOW BACK PAIN WITHOUT SCIATICA: Primary | ICD-10-CM

## 2018-03-09 PROCEDURE — 97110 THERAPEUTIC EXERCISES: CPT | Mod: PN
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
## 2018-03-13 ENCOUNTER — CLINICAL SUPPORT (OUTPATIENT)
Dept: REHABILITATION | Facility: HOSPITAL | Age: 33
End: 2018-03-13
Payer: COMMERCIAL

## 2018-03-13 DIAGNOSIS — M54.50 ACUTE LEFT-SIDED LOW BACK PAIN WITHOUT SCIATICA: Primary | ICD-10-CM

## 2018-03-13 PROCEDURE — 97110 THERAPEUTIC EXERCISES: CPT | Mod: PN

## 2018-03-13 NOTE — PROGRESS NOTES
Dry Needling Daily Note     Patient ID: Alvaro Ayoub is a 32 y.o. male.  Diagnosis:   1. Acute left-sided low back pain without sciatica       Date:  03/13/2018    Start Time: 9:55  Stop Time:  10:05    Subjective:     Pt reports: continued pain in his low back with minimal relief using HEP.  Pain Scale: Alvaro rates pain on a scale of 0-10 to be 6 currently.    Objective:     Pt signed written consent to dry needling Rx.  Pt gave verbal consent for DN.   Pt rec'd dry needling to LSP with 2-3 in needles with no adverse effects.    Homeostatic points:  1. Deep Radial  2. Greater Auricular  3. Spinal Accessory  4. Saphenous  5. Deep Fibular  6. Tibial  7. Greater Occipital  8. Suprascapular ( infraspinatus)  9. Lateral Antebrachial Cutaneous  10. Sural  11. Lateral Popliteal  12. Superficial Radial  13. Dorsal Scapular  14. Superior Cluneal  15. Posterior Cutaneous L 2  16. Inferior [id number]. Lateral Pectoral  18. Ilitotibal  19. Infraorbital  20. Spinous process T7  21. Posterior cutaneous  T6  22. Posterior cutaneous L 5  23. Supraorbital  24. Common fibular    Paravertebral Points:  L3-5 (bilateral)    Symptomatic Points:   none    Assessment:     Patient demonstrated appropriate response to FDN. Significant twitch responses noted in bilatereral lumbar paraspinals. Pt noted increased soreness and fatigue after session.    Patient Education/Response:     Education provided re:   Purpose, benefits, and potential side effects of dry needling.   Educated pt to use heat following treatment sessions to reduce c/o pain or soreness and to improve circulation to needled sites.   Encouraged pt to continue with HEP to maintain flexibility, ROM, and functional mobility.  Alvaro verbalized good understanding of education     Plans and Goals:     Monitor response to FDN. Continue with FDN in POC as tolerated.     Karen Lei, PT  3/13/2018

## 2018-03-16 ENCOUNTER — CLINICAL SUPPORT (OUTPATIENT)
Dept: REHABILITATION | Facility: HOSPITAL | Age: 33
End: 2018-03-16
Payer: COMMERCIAL

## 2018-03-16 DIAGNOSIS — M62.89 MUSCLE TIGHTNESS: ICD-10-CM

## 2018-03-16 DIAGNOSIS — M54.50 ACUTE LEFT-SIDED LOW BACK PAIN WITHOUT SCIATICA: Primary | ICD-10-CM

## 2018-03-16 DIAGNOSIS — M62.81 WEAKNESS OF TRUNK MUSCULATURE: ICD-10-CM

## 2018-03-16 DIAGNOSIS — M54.50 CHRONIC MIDLINE LOW BACK PAIN WITHOUT SCIATICA: ICD-10-CM

## 2018-03-16 DIAGNOSIS — G89.29 CHRONIC MIDLINE LOW BACK PAIN WITHOUT SCIATICA: ICD-10-CM

## 2018-03-16 PROCEDURE — 97110 THERAPEUTIC EXERCISES: CPT | Mod: PN
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
## 2018-03-20 ENCOUNTER — CLINICAL SUPPORT (OUTPATIENT)
Dept: REHABILITATION | Facility: HOSPITAL | Age: 33
End: 2018-03-20
Payer: COMMERCIAL

## 2018-03-20 DIAGNOSIS — M62.89 MUSCLE TIGHTNESS: ICD-10-CM

## 2018-03-20 DIAGNOSIS — M54.50 ACUTE LEFT-SIDED LOW BACK PAIN WITHOUT SCIATICA: Primary | ICD-10-CM

## 2018-03-20 DIAGNOSIS — M62.81 WEAKNESS OF TRUNK MUSCULATURE: ICD-10-CM

## 2018-03-20 PROCEDURE — 97750 PHYSICAL PERFORMANCE TEST: CPT | Mod: PN

## 2018-03-20 PROCEDURE — 97110 THERAPEUTIC EXERCISES: CPT | Mod: PN

## 2018-03-22 ENCOUNTER — CLINICAL SUPPORT (OUTPATIENT)
Dept: REHABILITATION | Facility: HOSPITAL | Age: 33
End: 2018-03-22
Payer: COMMERCIAL

## 2018-03-22 DIAGNOSIS — M54.50 ACUTE LEFT-SIDED LOW BACK PAIN WITHOUT SCIATICA: Primary | ICD-10-CM

## 2018-03-22 PROBLEM — G89.29 CHRONIC MIDLINE LOW BACK PAIN WITHOUT SCIATICA: Status: RESOLVED | Noted: 2017-05-18 | Resolved: 2018-03-22

## 2018-03-22 PROBLEM — M62.89 MUSCLE TIGHTNESS: Status: RESOLVED | Noted: 2017-05-18 | Resolved: 2018-03-22

## 2018-03-22 PROBLEM — M62.81 WEAKNESS OF TRUNK MUSCULATURE: Status: RESOLVED | Noted: 2017-05-18 | Resolved: 2018-03-22

## 2018-03-22 PROCEDURE — 97110 THERAPEUTIC EXERCISES: CPT | Mod: PN
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
## 2018-03-27 ENCOUNTER — CLINICAL SUPPORT (OUTPATIENT)
Dept: REHABILITATION | Facility: HOSPITAL | Age: 33
End: 2018-03-27
Payer: COMMERCIAL

## 2018-03-27 DIAGNOSIS — M54.50 ACUTE LEFT-SIDED LOW BACK PAIN WITHOUT SCIATICA: Primary | ICD-10-CM

## 2018-03-27 DIAGNOSIS — M62.89 MUSCLE TIGHTNESS: ICD-10-CM

## 2018-03-27 DIAGNOSIS — M62.81 WEAKNESS OF TRUNK MUSCULATURE: ICD-10-CM

## 2018-03-27 PROCEDURE — 97110 THERAPEUTIC EXERCISES: CPT | Mod: PN
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
## 2018-04-13 ENCOUNTER — CLINICAL SUPPORT (OUTPATIENT)
Dept: REHABILITATION | Facility: HOSPITAL | Age: 33
End: 2018-04-13
Payer: COMMERCIAL

## 2018-04-13 DIAGNOSIS — M62.81 WEAKNESS OF TRUNK MUSCULATURE: ICD-10-CM

## 2018-04-13 DIAGNOSIS — G89.29 CHRONIC MIDLINE LOW BACK PAIN WITHOUT SCIATICA: ICD-10-CM

## 2018-04-13 DIAGNOSIS — M54.50 ACUTE LEFT-SIDED LOW BACK PAIN WITHOUT SCIATICA: Primary | ICD-10-CM

## 2018-04-13 DIAGNOSIS — M54.50 CHRONIC MIDLINE LOW BACK PAIN WITHOUT SCIATICA: ICD-10-CM

## 2018-04-13 DIAGNOSIS — M62.89 MUSCLE TIGHTNESS: ICD-10-CM

## 2018-04-13 PROCEDURE — 97110 THERAPEUTIC EXERCISES: CPT | Mod: PN
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
## 2018-04-17 ENCOUNTER — CLINICAL SUPPORT (OUTPATIENT)
Dept: REHABILITATION | Facility: HOSPITAL | Age: 33
End: 2018-04-17
Payer: COMMERCIAL

## 2018-04-17 DIAGNOSIS — M62.81 WEAKNESS OF TRUNK MUSCULATURE: ICD-10-CM

## 2018-04-17 DIAGNOSIS — M54.50 CHRONIC MIDLINE LOW BACK PAIN WITHOUT SCIATICA: ICD-10-CM

## 2018-04-17 DIAGNOSIS — M62.89 MUSCLE TIGHTNESS: ICD-10-CM

## 2018-04-17 DIAGNOSIS — M54.50 ACUTE LEFT-SIDED LOW BACK PAIN WITHOUT SCIATICA: Primary | ICD-10-CM

## 2018-04-17 DIAGNOSIS — G89.29 CHRONIC MIDLINE LOW BACK PAIN WITHOUT SCIATICA: ICD-10-CM

## 2018-04-17 PROCEDURE — 97110 THERAPEUTIC EXERCISES: CPT | Mod: PN
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
## 2018-04-19 ENCOUNTER — CLINICAL SUPPORT (OUTPATIENT)
Dept: REHABILITATION | Facility: HOSPITAL | Age: 33
End: 2018-04-19
Payer: COMMERCIAL

## 2018-04-19 DIAGNOSIS — M54.50 ACUTE LEFT-SIDED LOW BACK PAIN WITHOUT SCIATICA: Primary | ICD-10-CM

## 2018-04-19 PROCEDURE — 97110 THERAPEUTIC EXERCISES: CPT | Mod: PN
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
## 2018-04-24 ENCOUNTER — CLINICAL SUPPORT (OUTPATIENT)
Dept: REHABILITATION | Facility: HOSPITAL | Age: 33
End: 2018-04-24
Payer: COMMERCIAL

## 2018-04-24 DIAGNOSIS — M54.50 CHRONIC MIDLINE LOW BACK PAIN WITHOUT SCIATICA: ICD-10-CM

## 2018-04-24 DIAGNOSIS — M62.89 MUSCLE TIGHTNESS: ICD-10-CM

## 2018-04-24 DIAGNOSIS — G89.29 CHRONIC MIDLINE LOW BACK PAIN WITHOUT SCIATICA: ICD-10-CM

## 2018-04-24 DIAGNOSIS — M62.81 WEAKNESS OF TRUNK MUSCULATURE: ICD-10-CM

## 2018-04-24 DIAGNOSIS — M54.50 ACUTE LEFT-SIDED LOW BACK PAIN WITHOUT SCIATICA: Primary | ICD-10-CM

## 2018-04-24 PROCEDURE — 97110 THERAPEUTIC EXERCISES: CPT | Mod: PN
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
## 2018-04-26 ENCOUNTER — CLINICAL SUPPORT (OUTPATIENT)
Dept: REHABILITATION | Facility: HOSPITAL | Age: 33
End: 2018-04-26
Payer: COMMERCIAL

## 2018-04-26 DIAGNOSIS — M54.50 ACUTE LEFT-SIDED LOW BACK PAIN WITHOUT SCIATICA: Primary | ICD-10-CM

## 2018-04-26 PROCEDURE — 97110 THERAPEUTIC EXERCISES: CPT | Mod: PN
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
## 2018-05-01 ENCOUNTER — CLINICAL SUPPORT (OUTPATIENT)
Dept: REHABILITATION | Facility: HOSPITAL | Age: 33
End: 2018-05-01
Payer: COMMERCIAL

## 2018-05-01 DIAGNOSIS — M54.50 ACUTE LEFT-SIDED LOW BACK PAIN WITHOUT SCIATICA: Primary | ICD-10-CM

## 2018-05-01 DIAGNOSIS — G89.29 CHRONIC MIDLINE LOW BACK PAIN WITHOUT SCIATICA: ICD-10-CM

## 2018-05-01 DIAGNOSIS — M62.89 MUSCLE TIGHTNESS: ICD-10-CM

## 2018-05-01 DIAGNOSIS — M62.81 WEAKNESS OF TRUNK MUSCULATURE: ICD-10-CM

## 2018-05-01 DIAGNOSIS — M54.50 CHRONIC MIDLINE LOW BACK PAIN WITHOUT SCIATICA: ICD-10-CM

## 2018-05-01 PROCEDURE — 97110 THERAPEUTIC EXERCISES: CPT | Mod: PN
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
## 2018-05-03 ENCOUNTER — CLINICAL SUPPORT (OUTPATIENT)
Dept: REHABILITATION | Facility: HOSPITAL | Age: 33
End: 2018-05-03
Payer: COMMERCIAL

## 2018-05-03 DIAGNOSIS — M54.50 ACUTE LEFT-SIDED LOW BACK PAIN WITHOUT SCIATICA: Primary | ICD-10-CM

## 2018-05-03 DIAGNOSIS — M62.81 WEAKNESS OF TRUNK MUSCULATURE: ICD-10-CM

## 2018-05-03 DIAGNOSIS — M54.50 CHRONIC MIDLINE LOW BACK PAIN WITHOUT SCIATICA: ICD-10-CM

## 2018-05-03 DIAGNOSIS — G89.29 CHRONIC MIDLINE LOW BACK PAIN WITHOUT SCIATICA: ICD-10-CM

## 2018-05-03 DIAGNOSIS — M62.89 MUSCLE TIGHTNESS: ICD-10-CM

## 2018-05-03 PROCEDURE — 97110 THERAPEUTIC EXERCISES: CPT | Mod: PN

## 2018-05-10 ENCOUNTER — CLINICAL SUPPORT (OUTPATIENT)
Dept: REHABILITATION | Facility: HOSPITAL | Age: 33
End: 2018-05-10
Payer: COMMERCIAL

## 2018-05-10 DIAGNOSIS — M54.50 ACUTE LEFT-SIDED LOW BACK PAIN WITHOUT SCIATICA: Primary | ICD-10-CM

## 2018-05-10 PROCEDURE — 97750 PHYSICAL PERFORMANCE TEST: CPT | Mod: PN

## 2018-05-10 PROCEDURE — 97110 THERAPEUTIC EXERCISES: CPT | Mod: PN
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
## 2019-04-11 ENCOUNTER — OFFICE VISIT (OUTPATIENT)
Dept: FAMILY MEDICINE | Facility: CLINIC | Age: 34
End: 2019-04-11
Payer: COMMERCIAL

## 2019-04-11 VITALS
WEIGHT: 254.63 LBS | SYSTOLIC BLOOD PRESSURE: 136 MMHG | DIASTOLIC BLOOD PRESSURE: 86 MMHG | HEART RATE: 75 BPM | OXYGEN SATURATION: 96 % | HEIGHT: 75 IN | RESPIRATION RATE: 16 BRPM | TEMPERATURE: 98 F | BODY MASS INDEX: 31.66 KG/M2

## 2019-04-11 DIAGNOSIS — J01.90 ACUTE BACTERIAL SINUSITIS: Primary | ICD-10-CM

## 2019-04-11 DIAGNOSIS — B96.89 ACUTE BACTERIAL SINUSITIS: Primary | ICD-10-CM

## 2019-04-11 DIAGNOSIS — J30.2 SEASONAL ALLERGIES: ICD-10-CM

## 2019-04-11 PROCEDURE — 99214 PR OFFICE/OUTPT VISIT, EST, LEVL IV, 30-39 MIN: ICD-10-PCS | Mod: S$GLB,,, | Performed by: NURSE PRACTITIONER

## 2019-04-11 PROCEDURE — 99999 PR PBB SHADOW E&M-EST. PATIENT-LVL III: ICD-10-PCS | Mod: PBBFAC,,, | Performed by: NURSE PRACTITIONER

## 2019-04-11 PROCEDURE — 99999 PR PBB SHADOW E&M-EST. PATIENT-LVL III: CPT | Mod: PBBFAC,,, | Performed by: NURSE PRACTITIONER

## 2019-04-11 PROCEDURE — 3008F PR BODY MASS INDEX (BMI) DOCUMENTED: ICD-10-PCS | Mod: CPTII,S$GLB,, | Performed by: NURSE PRACTITIONER

## 2019-04-11 PROCEDURE — 3008F BODY MASS INDEX DOCD: CPT | Mod: CPTII,S$GLB,, | Performed by: NURSE PRACTITIONER

## 2019-04-11 PROCEDURE — 99214 OFFICE O/P EST MOD 30 MIN: CPT | Mod: S$GLB,,, | Performed by: NURSE PRACTITIONER

## 2019-04-11 RX ORDER — BENZONATATE 200 MG/1
200 CAPSULE ORAL 3 TIMES DAILY PRN
Qty: 30 CAPSULE | Refills: 0 | Status: SHIPPED | OUTPATIENT
Start: 2019-04-11 | End: 2019-04-21

## 2019-04-11 RX ORDER — FLUTICASONE PROPIONATE 50 MCG
1 SPRAY, SUSPENSION (ML) NASAL DAILY
Qty: 15.8 ML | Refills: 0 | Status: SHIPPED | OUTPATIENT
Start: 2019-04-11

## 2019-04-11 RX ORDER — MONTELUKAST SODIUM 10 MG/1
10 TABLET ORAL NIGHTLY
Qty: 30 TABLET | Refills: 0 | Status: SHIPPED | OUTPATIENT
Start: 2019-04-11 | End: 2019-05-11

## 2019-04-11 RX ORDER — AMOXICILLIN AND CLAVULANATE POTASSIUM 875; 125 MG/1; MG/1
1 TABLET, FILM COATED ORAL EVERY 12 HOURS
Qty: 20 TABLET | Refills: 0 | Status: SHIPPED | OUTPATIENT
Start: 2019-04-11 | End: 2019-07-05

## 2019-04-11 NOTE — PROGRESS NOTES
Routine Office Visit    Patient Name: Alvaro Ayoub    : 1985  MRN: [id number]    Chief Complaint:  Allergies, Nasal Congestion    Subjective:  Alvaro is a 33 y.o. male who presents today for:    1. Allergies, Nasal Congestion - patient reports to the clinic today for allergies and nasal congestion. He states that he has been suffering with allergies including runny nose, sneezing, itchy and watery eyes for the last 2 months as well as nasal congestion for the last 2 months.  He states that he always feels congested but the last 3 days the congestion has gotten worse.  He states that he now has facial pain and a sore throat and was having a fever of greater than 101 for the last 2 days.  He states that he also has a productive cough.  Denies any shortness of breath, wheezing, chest pain, or palpitations.  Of note, he has no fever today.  He has tried a number of medications for this, including Claritin, Zyrtec, and Xyzal all of which have only provided moderate relief.  In addition to these medications she has tried ibuprofen for the fever which according to him bring his fever down.  He has not had to take any ibuprofen today.  He denies any sick contacts and has gotten the flu shot this year.    Past Medical History  Past Medical History:   Diagnosis Date    Hypertension        Past Surgical History  No past surgical history on file.    Family History  Family History   Problem Relation Age of Onset    Hypertension Mother     Hypertension Maternal Grandmother        Social History  Social History     Socioeconomic History    Marital status:      Spouse name: Not on file    Number of children: Not on file    Years of education: Not on file    Highest education level: Not on file   Occupational History    Not on file   Social Needs    Financial resource strain: Not on file    Food insecurity:     Worry: Not on file     Inability: Not on file    Transportation needs:     Medical:  Not on file     Non-medical: Not on file   Tobacco Use    Smoking status: Never Smoker   Substance and Sexual Activity    Alcohol use: No     Alcohol/week: 0.0 oz    Drug use: Not on file    Sexual activity: Not on file   Lifestyle    Physical activity:     Days per week: Not on file     Minutes per session: Not on file    Stress: Not on file   Relationships    Social connections:     Talks on phone: Not on file     Gets together: Not on file     Attends Episcopal service: Not on file     Active member of club or organization: Not on file     Attends meetings of clubs or organizations: Not on file     Relationship status: Not on file   Other Topics Concern    Not on file   Social History Narrative    Not on file       Current Medications  Current Outpatient Medications on File Prior to Visit   Medication Sig Dispense Refill    amlodipine (NORVASC) 5 MG tablet Take 5 mg by mouth.      diazePAM (VALIUM) 5 MG tablet Take 1 tablet (5 mg total) by mouth every 6 (six) hours as needed for Anxiety. 20 tablet 0    diclofenac (VOLTAREN) 75 MG EC tablet Take 1 tablet (75 mg total) by mouth 2 (two) times daily as needed. 60 tablet 1    naproxen (NAPROSYN) 500 MG tablet Take 500 mg by mouth 2 (two) times daily.      traMADol (ULTRAM) 50 mg tablet Take 50 mg by mouth every 6 (six) hours as needed for Pain.       No current facility-administered medications on file prior to visit.        Allergies   Review of patient's allergies indicates:  No Known Allergies    Review of Systems (Pertinent positives)  Review of Systems   Constitutional: Positive for fever. Negative for chills, diaphoresis, malaise/fatigue and weight loss.   HENT: Positive for congestion, sinus pain and sore throat. Negative for ear discharge, ear pain, hearing loss, nosebleeds and tinnitus.         Chronic allergies   Eyes: Negative.    Respiratory: Positive for cough and sputum production. Negative for hemoptysis, shortness of breath, wheezing and  "stridor.    Cardiovascular: Negative.    Gastrointestinal: Negative.    Genitourinary: Negative.    Musculoskeletal: Negative.    Skin: Negative.    Neurological: Negative.    Endo/Heme/Allergies: Negative.    Psychiatric/Behavioral: Negative.        /86 (BP Location: Right arm, Patient Position: Sitting, BP Method: Medium (Automatic))   Pulse 75   Temp 98.2 °F (36.8 °C) (Oral)   Resp 16   Ht 6' 3" (1.905 m)   Wt 115.5 kg (254 lb 10.1 oz)   SpO2 96%   BMI 31.83 kg/m²     Physical Exam   Constitutional: He is oriented to person, place, and time. He appears well-developed and well-nourished. No distress.   HENT:   Head: Normocephalic and atraumatic.   Right Ear: Tympanic membrane, external ear and ear canal normal.   Left Ear: Tympanic membrane, external ear and ear canal normal.   Nose: Mucosal edema and rhinorrhea present. Right sinus exhibits frontal sinus tenderness. Right sinus exhibits no maxillary sinus tenderness. Left sinus exhibits frontal sinus tenderness. Left sinus exhibits no maxillary sinus tenderness.   Mouth/Throat: Uvula is midline and mucous membranes are normal. Posterior oropharyngeal erythema present.   Eyes: Pupils are equal, round, and reactive to light. Conjunctivae and EOM are normal.   Neck: Normal range of motion. Neck supple.   Cardiovascular: Normal rate, regular rhythm, normal heart sounds and intact distal pulses. Exam reveals no gallop and no friction rub.   No murmur heard.  Pulmonary/Chest: Effort normal and breath sounds normal. No stridor. No respiratory distress. He has no wheezes. He has no rales. He exhibits no tenderness.   Abdominal: Soft. Bowel sounds are normal. He exhibits no distension and no mass. There is no rebound. No hernia.   Musculoskeletal: Normal range of motion.   Neurological: He is alert and oriented to person, place, and time.   Skin: Skin is warm and dry. Capillary refill takes less than 2 seconds. He is not diaphoretic.    "     Assessment/Plan:  Alvaro Ayoub is a 33 y.o. male who presents today for :    Alvaro was seen today for facial pain, fever, nasal congestion and cough.    Diagnoses and all orders for this visit:    Acute bacterial sinusitis  -     amoxicillin-clavulanate 875-125mg (AUGMENTIN) 875-125 mg per tablet; Take 1 tablet by mouth every 12 (twelve) hours.    Seasonal allergies  -     montelukast (SINGULAIR) 10 mg tablet; Take 1 tablet (10 mg total) by mouth every evening.  -     fluticasone (FLONASE) 50 mcg/actuation nasal spray; 1 spray (50 mcg total) by Each Nare route once daily.  -     benzonatate (TESSALON) 200 MG capsule; Take 1 capsule (200 mg total) by mouth 3 (three) times daily as needed for Cough.     He has facial tenderness and symptoms are greater than 10 days.  Will treat as bacterial infection at this time.  Will start Singulair for chronic allergies.  I instructed the patient that he needs to take Flonase and Singulair daily to provide the most benefit from these medications.  Drink plenty of water.  Alternate Tylenol and ibuprofen for any aches, pains, or fevers.  Use warm salt water gargles are warm tea with honey as needed for sore throat.  He is to follow-up to the clinic or call the clinic if no relief in signs or symptoms.        This office note has been dictated.  This dictation has been generated using M-Modal Fluency Direct dictation; some phonetic errors may occur.   My collaborating physician is Dr. Juanpablo Morgan.

## 2019-04-11 NOTE — PATIENT INSTRUCTIONS

## 2019-06-24 ENCOUNTER — OFFICE VISIT (OUTPATIENT)
Dept: FAMILY MEDICINE | Facility: CLINIC | Age: 34
End: 2019-06-24
Payer: COMMERCIAL

## 2019-06-24 VITALS
SYSTOLIC BLOOD PRESSURE: 152 MMHG | TEMPERATURE: 98 F | DIASTOLIC BLOOD PRESSURE: 94 MMHG | BODY MASS INDEX: 31.14 KG/M2 | WEIGHT: 250.44 LBS | HEIGHT: 75 IN | HEART RATE: 64 BPM | OXYGEN SATURATION: 98 % | RESPIRATION RATE: 16 BRPM

## 2019-06-24 DIAGNOSIS — M25.562 ACUTE PAIN OF LEFT KNEE: Primary | ICD-10-CM

## 2019-06-24 PROCEDURE — 99999 PR PBB SHADOW E&M-EST. PATIENT-LVL IV: ICD-10-PCS | Mod: PBBFAC,,, | Performed by: NURSE PRACTITIONER

## 2019-06-24 PROCEDURE — 99999 PR PBB SHADOW E&M-EST. PATIENT-LVL IV: CPT | Mod: PBBFAC,,, | Performed by: NURSE PRACTITIONER

## 2019-06-24 PROCEDURE — 3008F BODY MASS INDEX DOCD: CPT | Mod: CPTII,S$GLB,, | Performed by: NURSE PRACTITIONER

## 2019-06-24 PROCEDURE — 99214 PR OFFICE/OUTPT VISIT, EST, LEVL IV, 30-39 MIN: ICD-10-PCS | Mod: S$GLB,,, | Performed by: NURSE PRACTITIONER

## 2019-06-24 PROCEDURE — 3008F PR BODY MASS INDEX (BMI) DOCUMENTED: ICD-10-PCS | Mod: CPTII,S$GLB,, | Performed by: NURSE PRACTITIONER

## 2019-06-24 PROCEDURE — 99214 OFFICE O/P EST MOD 30 MIN: CPT | Mod: S$GLB,,, | Performed by: NURSE PRACTITIONER

## 2019-06-24 RX ORDER — MELOXICAM 15 MG/1
15 TABLET ORAL DAILY
Qty: 15 TABLET | Refills: 0 | Status: SHIPPED | OUTPATIENT
Start: 2019-06-24 | End: 2019-07-05

## 2019-06-24 NOTE — PROGRESS NOTES
Routine Office Visit    Patient Name: Alvaro Ayoub    : 1985  MRN: [id number]    Chief Complaint:  Left knee pain    Subjective:  Alvaro is a 34 y.o. male who presents today for:    1.  Left knee pain - patient reports today for evaluation of knee pain. He states that 1 week ago he was doing jumping exercises and then after that he started having left knee pain. He denies any weakness to the left knee, he denies any  sensation of the left knee giving out.  He did not hear anything break or pop during these exercises.  He states that when he is resting his knee he does not really feel pain, however he feels the pain most when walking when he stands up.  The pain is located along the anterior inferior aspect of the knee and does not radiate.  The pain is aching in nature.  He denies any decreased range of motion of the knee.  Denies any numbness or tingling of the left leg.  Denies any instability of the knee.  For the symptoms he has been resting his knee which he states has not improved the symptoms.  He has not tried any medications for the knee pain.    Past Medical History  Past Medical History:   Diagnosis Date    Hypertension        Past Surgical History  No past surgical history on file.    Family History  Family History   Problem Relation Age of Onset    Hypertension Mother     Hypertension Maternal Grandmother        Social History  Social History     Socioeconomic History    Marital status:      Spouse name: Not on file    Number of children: Not on file    Years of education: Not on file    Highest education level: Not on file   Occupational History    Not on file   Social Needs    Financial resource strain: Not on file    Food insecurity:     Worry: Not on file     Inability: Not on file    Transportation needs:     Medical: Not on file     Non-medical: Not on file   Tobacco Use    Smoking status: Never Smoker   Substance and Sexual Activity    Alcohol use: No      Alcohol/week: 0.0 oz    Drug use: Not on file    Sexual activity: Not on file   Lifestyle    Physical activity:     Days per week: Not on file     Minutes per session: Not on file    Stress: Not on file   Relationships    Social connections:     Talks on phone: Not on file     Gets together: Not on file     Attends Jain service: Not on file     Active member of club or organization: Not on file     Attends meetings of clubs or organizations: Not on file     Relationship status: Not on file   Other Topics Concern    Not on file   Social History Narrative    Not on file       Current Medications  Current Outpatient Medications on File Prior to Visit   Medication Sig Dispense Refill    amlodipine (NORVASC) 5 MG tablet Take 5 mg by mouth.      fluticasone (FLONASE) 50 mcg/actuation nasal spray 1 spray (50 mcg total) by Each Nare route once daily. 15.8 mL 0    amoxicillin-clavulanate 875-125mg (AUGMENTIN) 875-125 mg per tablet Take 1 tablet by mouth every 12 (twelve) hours. 20 tablet 0    diazePAM (VALIUM) 5 MG tablet Take 1 tablet (5 mg total) by mouth every 6 (six) hours as needed for Anxiety. 20 tablet 0    diclofenac (VOLTAREN) 75 MG EC tablet Take 1 tablet (75 mg total) by mouth 2 (two) times daily as needed. 60 tablet 1    traMADol (ULTRAM) 50 mg tablet Take 50 mg by mouth every 6 (six) hours as needed for Pain.      [DISCONTINUED] naproxen (NAPROSYN) 500 MG tablet Take 500 mg by mouth 2 (two) times daily.       No current facility-administered medications on file prior to visit.        Allergies   Review of patient's allergies indicates:  No Known Allergies    Review of Systems (Pertinent positives)  Review of Systems   Constitutional: Negative for chills and fever.   HENT: Negative.    Eyes: Negative.    Respiratory: Negative.    Cardiovascular: Negative.    Gastrointestinal: Negative.    Genitourinary: Negative.    Musculoskeletal: Positive for joint pain. Negative for back pain, falls,  "myalgias and neck pain.   Skin: Negative.    Neurological: Negative.    Endo/Heme/Allergies: Negative.    Psychiatric/Behavioral: Negative.        BP (!) 152/94 (BP Location: Right arm, Patient Position: Sitting, BP Method: Large (Manual))   Pulse 64   Temp 98.2 °F (36.8 °C) (Oral)   Resp 16   Ht 6' 3" (1.905 m)   Wt 113.6 kg (250 lb 7.1 oz)   SpO2 98%   BMI 31.30 kg/m²     Physical Exam   Constitutional: He is oriented to person, place, and time. He appears well-developed and well-nourished. No distress.   Cardiovascular: Normal rate, regular rhythm, normal heart sounds and intact distal pulses. Exam reveals no gallop and no friction rub.   No murmur heard.  Pulmonary/Chest: Effort normal and breath sounds normal. No stridor. No respiratory distress. He has no wheezes. He has no rales. He exhibits no tenderness.   Abdominal: Soft. Bowel sounds are normal. He exhibits no distension.   Musculoskeletal: Normal range of motion.        Left knee: He exhibits normal range of motion, no swelling, no effusion, no ecchymosis, no deformity, no laceration, no erythema, no LCL laxity, normal patellar mobility, no bony tenderness, normal meniscus and no MCL laxity. Tenderness found. Patellar tendon tenderness noted.   left knee - no tenderness to palpation at patella or head of fibula   Neurological: He is alert and oriented to person, place, and time.   Skin: Skin is warm. Capillary refill takes less than 2 seconds. He is not diaphoretic.        Assessment/Plan:  Alvaro Ayoub is a 34 y.o. male who presents today for :    Alvaro was seen today for knee pain.    Diagnoses and all orders for this visit:    Acute pain of left knee  -     Cancel: Ambulatory referral to Orthopedics  -     Ambulatory referral to Orthopedics  -     meloxicam (MOBIC) 15 MG tablet; Take 1 tablet (15 mg total) by mouth once daily.    The patient would like a referral to Orthopedics.  I will provide this.  Pain is most likely due to " patellar tendinitis/tendinopathy given tenderness to palpation at the patellar tendon.  No need for x-ray or imaging at this time via the [place] knee rules.  Patient was instructed to get an over-the-counter brace at his local pharmacy and wear this especially when walking.  He was also instructed to ice the knee as needed for any symptoms.  Also emphasized rest, compression, and elevation.  Educated the patient that it may take some time for his knee pain to go away.  Follow-up with Orthopedics as needed.  Patient was educated regarding the side effects of NSAIDs, including GI ulceration, kidney dysfunction, GI bleeding, and stomach upset.  Patient was instructed to stop the medication and call the clinic if the patient experiences any stomach upset, black tarry stools, bloody stools, or vomiting while taking this medication.  Do not take Mobic with other NSAIDs.  Patient verbalized understanding of instructions.        This office note has been dictated.  This dictation has been generated using M-Modal Fluency Direct dictation; some phonetic errors may occur.   My collaborating physician is Dr. Juanpablo Morgan.

## 2019-07-05 ENCOUNTER — OFFICE VISIT (OUTPATIENT)
Dept: FAMILY MEDICINE | Facility: CLINIC | Age: 34
End: 2019-07-05
Payer: COMMERCIAL

## 2019-07-05 VITALS
SYSTOLIC BLOOD PRESSURE: 140 MMHG | DIASTOLIC BLOOD PRESSURE: 86 MMHG | WEIGHT: 251.31 LBS | OXYGEN SATURATION: 98 % | TEMPERATURE: 99 F | HEIGHT: 75 IN | RESPIRATION RATE: 17 BRPM | BODY MASS INDEX: 31.25 KG/M2 | HEART RATE: 52 BPM

## 2019-07-05 DIAGNOSIS — I10 BENIGN ESSENTIAL HTN: ICD-10-CM

## 2019-07-05 DIAGNOSIS — Z00.00 VISIT FOR WELL MAN HEALTH CHECK: Primary | ICD-10-CM

## 2019-07-05 PROCEDURE — 99395 PR PREVENTIVE VISIT,EST,18-39: ICD-10-PCS | Mod: S$GLB,,, | Performed by: FAMILY MEDICINE

## 2019-07-05 PROCEDURE — 99395 PREV VISIT EST AGE 18-39: CPT | Mod: S$GLB,,, | Performed by: FAMILY MEDICINE

## 2019-07-05 PROCEDURE — 99999 PR PBB SHADOW E&M-EST. PATIENT-LVL III: CPT | Mod: PBBFAC,,, | Performed by: FAMILY MEDICINE

## 2019-07-05 PROCEDURE — 99999 PR PBB SHADOW E&M-EST. PATIENT-LVL III: ICD-10-PCS | Mod: PBBFAC,,, | Performed by: FAMILY MEDICINE

## 2019-07-05 RX ORDER — AMLODIPINE BESYLATE 5 MG/1
5 TABLET ORAL DAILY
Qty: 30 TABLET | Refills: 2 | Status: SHIPPED | OUTPATIENT
Start: 2019-07-05 | End: 2019-08-29 | Stop reason: SDUPTHER

## 2019-07-05 NOTE — PROGRESS NOTES
"Well Man VISIT      CHIEF COMPLAINT  Chief Complaint   Patient presents with    [facility] [place] [facility]       HPI  Alvaro Ayoub is a 34 y.o. male who presents for physical.     Social Factors  Tobacco use: No  Ready to Quit: No  Alcohol: Yes on occasion  Intimate partner violence screening  "Do you feel safe in your current relationship?" Yes   "Have you ever been in a relationship in which your partner frightened you or hurt you?" No  Living Will/POA: No  Regular Exercise: Yes plays sports regularly    Depression  Over the past two weeks, have you felt down, depressed, or hopeless? No  Over the past two weeks, have you felt little interest or pleasure in doing things? No    Reproductive Health  STD screening in last year: deferred  HIV screening: deferred    Screen for Chronic Disease  CHD Risk Factors: hypertension, male gender and obesity (BMI >= 30 kg/m2)  Estimated body mass index is 31.41 kg/m² as calculated from the following:    Height as of this encounter: 6' 3" (1.905 m).    Weight as of this encounter: 114 kg (251 lb 5.2 oz).  Dyslipidemia screening needed: order today  T2DM screening needed: ordered today    Colonoscopy needed: n/a  PSA needed: n/a  AAA screening needed:n/a  Screen men 35 years and older, and men 20 to 34 years of age who have cardiovascular risk factors for dyslipidemia  Begin screening colonoscopies at 50 years of age in men of average risk, and continue until 75 years of age; offer fecal occult blood testing every year, flexible sigmoidoscopy every five years combined with fecal occult blood testing every three years, or colonoscopy every 10 years   The American U[employer] Association recommends offering PSA testing and digital rectal examination to well-informed men beginning at 40 years of age and continuing until life expectancy is less than 10 years  Screen once with ultrasonography in men 65 to 75 years of age if they have a family history or have smoked at [id number] " "cigarettes in their lifetime  Screen men with a sustained blood pressure greater than 135/80 mm Hg for T2DM      Immunizations  stated as up to date, no records available    ALLERGIES and MEDS were verified.   PMHx, PSHx, FHx, SOCIALHx were updated as pertinent.    REVIEW OF SYSTEMS  Review of Systems   Constitutional: Negative.    HENT: Negative.    Eyes: Negative.    Respiratory: Negative.    Cardiovascular: Negative.    Gastrointestinal: Negative.    Genitourinary: Negative.    Musculoskeletal: Negative.    Skin: Negative.    Neurological: Negative.    Psychiatric/Behavioral: Negative.          PHYSICAL EXAM  VITAL SIGNS: BP (!) 140/86 (BP Location: Left arm, Patient Position: Sitting, BP Method: Large (Automatic))   Pulse (!) 52   Temp 98.5 °F (36.9 °C) (Oral)   Resp 17   Ht 6' 3" (1.905 m)   Wt 114 kg (251 lb 5.2 oz)   SpO2 98%   BMI 31.41 kg/m²   GEN: Well developed, Well nourished, No acute distress.  HENT: Normocephalic, Atraumatic, Bilateral external ears normal, Nose normal, Oropharynx moist, No oral exudates.   Eyes: PERRL, EOMI, Conjunctiva normal, No discharge.   Neck: Supple, No tenderness.  Lymphatic: No cervical or supraclavicular lymphadenopathy noted.   Cardiovascular: Normal heart rate, Normal rhythm, No murmurs, No rubs, No gallops.   Thorax & Lungs: Normal breath sounds, No respiratory distress, No wheezing.  Abdomen: Soft, No tenderness, Bowel sounds normal.  Genital: deferred  Skin: Warm, Dry, No erythema, No rash.   Extremities: No edema, No tenderness.       ASSESSMENT/PLAN    Alvaro was seen today for establish care.    Diagnoses and all orders for this visit:    Visit for [facility] check  -     CBC auto differential; Future  -     Comprehensive metabolic panel; Future  -     Lipid panel; Future  -     Urinalysis; Future  -     Hemoglobin A1c; Future  -     TSH; Future    Benign essential HTN  -     amLODIPine (NORVASC) 5 MG tablet; Take 1 tablet (5 mg total) by mouth once " daily.           FOLLOW UP: 4 weeks for blood pressure      Tutu Bautista MD

## 2019-08-06 DIAGNOSIS — M25.562 LEFT KNEE PAIN, UNSPECIFIED CHRONICITY: Primary | ICD-10-CM

## 2019-08-29 ENCOUNTER — OFFICE VISIT (OUTPATIENT)
Dept: FAMILY MEDICINE | Facility: CLINIC | Age: 34
End: 2019-08-29
Payer: COMMERCIAL

## 2019-08-29 VITALS
WEIGHT: 251.31 LBS | OXYGEN SATURATION: 98 % | DIASTOLIC BLOOD PRESSURE: 84 MMHG | BODY MASS INDEX: 31.25 KG/M2 | HEART RATE: 68 BPM | HEIGHT: 75 IN | TEMPERATURE: 98 F | SYSTOLIC BLOOD PRESSURE: 139 MMHG | RESPIRATION RATE: 17 BRPM

## 2019-08-29 DIAGNOSIS — I10 BENIGN ESSENTIAL HTN: ICD-10-CM

## 2019-08-29 PROCEDURE — 99214 PR OFFICE/OUTPT VISIT, EST, LEVL IV, 30-39 MIN: ICD-10-PCS | Mod: S$GLB,,, | Performed by: FAMILY MEDICINE

## 2019-08-29 PROCEDURE — 99999 PR PBB SHADOW E&M-EST. PATIENT-LVL III: ICD-10-PCS | Mod: PBBFAC,,, | Performed by: FAMILY MEDICINE

## 2019-08-29 PROCEDURE — 3008F BODY MASS INDEX DOCD: CPT | Mod: CPTII,S$GLB,, | Performed by: FAMILY MEDICINE

## 2019-08-29 PROCEDURE — 99999 PR PBB SHADOW E&M-EST. PATIENT-LVL III: CPT | Mod: PBBFAC,,, | Performed by: FAMILY MEDICINE

## 2019-08-29 PROCEDURE — 3008F PR BODY MASS INDEX (BMI) DOCUMENTED: ICD-10-PCS | Mod: CPTII,S$GLB,, | Performed by: FAMILY MEDICINE

## 2019-08-29 PROCEDURE — 99214 OFFICE O/P EST MOD 30 MIN: CPT | Mod: S$GLB,,, | Performed by: FAMILY MEDICINE

## 2019-08-29 RX ORDER — AMLODIPINE BESYLATE 5 MG/1
5 TABLET ORAL DAILY
Qty: 30 TABLET | Refills: 5 | Status: SHIPPED | OUTPATIENT
Start: 2019-08-29

## 2019-08-29 NOTE — PROGRESS NOTES
Routine Office Visit    Patient Name: Alvaro Ayoub    : 1985  MRN: [id number]    Subjective:  Alvaro is a 34 y.o. male who presents today for:    1. htn  Patient presenting today with htn.  He states that he has been taking his medication as prescribed most days.  No adverse reactions.  He denies any chest pain, weakness, numbness, or shortness of breath.  He has not had any leg swelling.  Patient states that he continues to work his regular job.  No new complaints    Past Medical History  Past Medical History:   Diagnosis Date    Hypertension        Past Surgical History  History reviewed. No pertinent surgical history.    Family History  Family History   Problem Relation Age of Onset    Hypertension Mother     Hypertension Maternal Grandmother        Social History  Social History     Socioeconomic History    Marital status:      Spouse name: Not on file    Number of children: Not on file    Years of education: Not on file    Highest education level: Not on file   Occupational History    Not on file   Social Needs    Financial resource strain: Not on file    Food insecurity:     Worry: Not on file     Inability: Not on file    Transportation needs:     Medical: Not on file     Non-medical: Not on file   Tobacco Use    Smoking status: Never Smoker   Substance and Sexual Activity    Alcohol use: No     Alcohol/week: 0.0 oz    Drug use: Not on file    Sexual activity: Not on file   Lifestyle    Physical activity:     Days per week: Not on file     Minutes per session: Not on file    Stress: Not on file   Relationships    Social connections:     Talks on phone: Not on file     Gets together: Not on file     Attends [employer] service: Not on file     Active member of club or organization: Not on file     Attends meetings of clubs or organizations: Not on file     Relationship status: Not on file   Other Topics Concern    Not on file   Social History Narrative    Not on file  "      Current Medications  Current Outpatient Medications on File Prior to Visit   Medication Sig Dispense Refill    fluticasone (FLONASE) 50 mcg/actuation nasal spray 1 spray (50 mcg total) by Each Nare route once daily. 15.8 mL 0    [DISCONTINUED] amLODIPine (NORVASC) 5 MG tablet Take 1 tablet (5 mg total) by mouth once daily. 30 tablet 2     No current facility-administered medications on file prior to visit.        Allergies   Review of patient's allergies indicates:   Allergen Reactions    Lisinopril        Review of Systems (Pertinent positives)  Review of Systems   Constitutional: Positive for malaise/fatigue.   HENT: Negative.    Eyes: Negative.    Respiratory: Negative.    Cardiovascular: Negative.    Gastrointestinal: Negative.    Musculoskeletal: Negative.    Skin: Negative.    Neurological: Negative.          /84 (BP Location: Left arm, Patient Position: Sitting, BP Method: Large (Automatic))   Pulse 68   Temp 98.1 °F (36.7 °C) (Oral)   Resp 17   Ht 6' 3" (1.905 m)   Wt 114 kg (251 lb 5.2 oz)   SpO2 98%   BMI 31.41 kg/m²     GENERAL APPEARANCE: in no apparent distress and well developed and well nourished  HEENT: PERRL, EOMI, Sclera clear, anicteric, Oropharynx clear, no lesions, Neck supple with midline trachea  NECK: normal, supple, no adenopathy, thyroid normal in size  RESPIRATORY: appears well, vitals normal, no respiratory distress, acyanotic, normal RR, chest clear, no wheezing, crepitations, rhonchi, normal symmetric air entry  HEART: regular rate and rhythm, S1, S2 normal, no murmur, click, rub or gallop.    ABDOMEN: abdomen is soft without tenderness, no masses, no hernias, no organomegaly, no rebound, no guarding. Suprapubic tenderness absent. No CVA tenderness.  NEUROLOGIC: normal without focal findings, CN II-XII are intact.   SKIN: no rashes, no wounds, no other lesions  PSYCH: Alert, oriented x 3, thought content appropriate, speech normal, pleasant and cooperative, good " eye contact, well groomed    Assessment/Plan:  Alvaro Ayoub is a 34 y.o. male who presents today for :    Alvaro was seen today for follow-up.    Diagnoses and all orders for this visit:    Benign essential HTN  -     amLODIPine (NORVASC) 5 MG tablet; Take 1 tablet (5 mg total) by mouth once daily.      1.  norvasc refilled  2.  He is to take his medication around the same time every day  3.  He is to go to the ED for any weakness, numbness, slurred speech, or chest pain  4.  Follow up 6 months or sooner if needed      Tutu Bautista MD

## 2020-02-26 ENCOUNTER — OFFICE VISIT (OUTPATIENT)
Dept: FAMILY MEDICINE | Facility: CLINIC | Age: 35
End: 2020-02-26
Payer: COMMERCIAL

## 2020-02-26 VITALS
RESPIRATION RATE: 20 BRPM | SYSTOLIC BLOOD PRESSURE: 133 MMHG | HEIGHT: 75 IN | HEART RATE: 61 BPM | WEIGHT: 255.75 LBS | TEMPERATURE: 98 F | BODY MASS INDEX: 31.8 KG/M2 | DIASTOLIC BLOOD PRESSURE: 82 MMHG | OXYGEN SATURATION: 97 %

## 2020-02-26 DIAGNOSIS — M54.50 CHRONIC BILATERAL LOW BACK PAIN WITHOUT SCIATICA: Primary | ICD-10-CM

## 2020-02-26 DIAGNOSIS — G89.29 CHRONIC BILATERAL LOW BACK PAIN WITHOUT SCIATICA: Primary | ICD-10-CM

## 2020-02-26 DIAGNOSIS — M25.552 ACUTE PAIN OF LEFT HIP: ICD-10-CM

## 2020-02-26 PROCEDURE — 3075F PR MOST RECENT SYSTOLIC BLOOD PRESS GE 130-139MM HG: ICD-10-PCS | Mod: CPTII,S$GLB,, | Performed by: NURSE PRACTITIONER

## 2020-02-26 PROCEDURE — 3075F SYST BP GE 130 - 139MM HG: CPT | Mod: CPTII,S$GLB,, | Performed by: NURSE PRACTITIONER

## 2020-02-26 PROCEDURE — 3079F PR MOST RECENT DIASTOLIC BLOOD PRESSURE 80-89 MM HG: ICD-10-PCS | Mod: CPTII,S$GLB,, | Performed by: NURSE PRACTITIONER

## 2020-02-26 PROCEDURE — 3008F BODY MASS INDEX DOCD: CPT | Mod: CPTII,S$GLB,, | Performed by: NURSE PRACTITIONER

## 2020-02-26 PROCEDURE — 99214 PR OFFICE/OUTPT VISIT, EST, LEVL IV, 30-39 MIN: ICD-10-PCS | Mod: S$GLB,,, | Performed by: NURSE PRACTITIONER

## 2020-02-26 PROCEDURE — 99999 PR PBB SHADOW E&M-EST. PATIENT-LVL IV: CPT | Mod: PBBFAC,,, | Performed by: NURSE PRACTITIONER

## 2020-02-26 PROCEDURE — 99999 PR PBB SHADOW E&M-EST. PATIENT-LVL IV: ICD-10-PCS | Mod: PBBFAC,,, | Performed by: NURSE PRACTITIONER

## 2020-02-26 PROCEDURE — 99214 OFFICE O/P EST MOD 30 MIN: CPT | Mod: S$GLB,,, | Performed by: NURSE PRACTITIONER

## 2020-02-26 PROCEDURE — 3008F PR BODY MASS INDEX (BMI) DOCUMENTED: ICD-10-PCS | Mod: CPTII,S$GLB,, | Performed by: NURSE PRACTITIONER

## 2020-02-26 PROCEDURE — 3079F DIAST BP 80-89 MM HG: CPT | Mod: CPTII,S$GLB,, | Performed by: NURSE PRACTITIONER

## 2020-02-26 RX ORDER — NABUMETONE 750 MG/1
750 TABLET, FILM COATED ORAL 2 TIMES DAILY PRN
Qty: 20 TABLET | Refills: 0 | Status: SHIPPED | OUTPATIENT
Start: 2020-02-26
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
## 2020-03-31 ENCOUNTER — TELEPHONE (OUTPATIENT)
Dept: PAIN MEDICINE | Facility: CLINIC | Age: 35
End: 2020-03-31

## 2020-04-01 ENCOUNTER — TELEPHONE (OUTPATIENT)
Dept: PAIN MEDICINE | Facility: CLINIC | Age: 35
End: 2020-04-01

## 2020-04-01 NOTE — TELEPHONE ENCOUNTER
Called pt again na . Called wife Shahid and she scheduled pt for virtual visit tomorrow with Dr. Benson . Preferred 2:30 pm

## 2020-04-02 ENCOUNTER — PATIENT OUTREACH (OUTPATIENT)
Dept: ADMINISTRATIVE | Facility: OTHER | Age: 35
End: 2020-04-02

## 2021-04-06 ENCOUNTER — PATIENT MESSAGE (OUTPATIENT)
Dept: ADMINISTRATIVE | Facility: HOSPITAL | Age: 36
End: 2021-04-06

## 2021-04-15 ENCOUNTER — PATIENT MESSAGE (OUTPATIENT)
Dept: RESEARCH | Facility: HOSPITAL | Age: 36
End: 2021-04-15

## 2021-07-07 ENCOUNTER — PATIENT MESSAGE (OUTPATIENT)
Dept: ADMINISTRATIVE | Facility: HOSPITAL | Age: 36
End: 2021-07-07

## 2021-10-04 ENCOUNTER — PATIENT MESSAGE (OUTPATIENT)
Dept: ADMINISTRATIVE | Facility: HOSPITAL | Age: 36
End: 2021-10-04

## 2022-01-13 ENCOUNTER — PATIENT OUTREACH (OUTPATIENT)
Dept: ADMINISTRATIVE | Facility: HOSPITAL | Age: 37
End: 2022-01-13

## 2022-03-15 ENCOUNTER — TELEPHONE (OUTPATIENT)
Dept: FAMILY MEDICINE | Facility: CLINIC | Age: 37
End: 2022-03-15

## 2022-06-01 ENCOUNTER — PATIENT MESSAGE (OUTPATIENT)
Dept: ADMINISTRATIVE | Facility: HOSPITAL | Age: 37
End: 2022-06-01

## 2022-06-21 ENCOUNTER — TELEPHONE (OUTPATIENT)
Dept: FAMILY MEDICINE | Facility: CLINIC | Age: 37
End: 2022-06-21